# Patient Record
Sex: MALE
[De-identification: names, ages, dates, MRNs, and addresses within clinical notes are randomized per-mention and may not be internally consistent; named-entity substitution may affect disease eponyms.]

---

## 2017-12-25 ENCOUNTER — HOSPITAL ENCOUNTER (INPATIENT)
Dept: HOSPITAL 42 - ED | Age: 57
LOS: 8 days | Discharge: HOME | DRG: 871 | End: 2018-01-02
Attending: INTERNAL MEDICINE | Admitting: INTERNAL MEDICINE
Payer: COMMERCIAL

## 2017-12-25 VITALS — BODY MASS INDEX: 46.6 KG/M2

## 2017-12-25 DIAGNOSIS — J44.1: ICD-10-CM

## 2017-12-25 DIAGNOSIS — J44.0: ICD-10-CM

## 2017-12-25 DIAGNOSIS — R40.2412: ICD-10-CM

## 2017-12-25 DIAGNOSIS — R26.81: ICD-10-CM

## 2017-12-25 DIAGNOSIS — F39: ICD-10-CM

## 2017-12-25 DIAGNOSIS — G47.33: ICD-10-CM

## 2017-12-25 DIAGNOSIS — F41.9: ICD-10-CM

## 2017-12-25 DIAGNOSIS — K65.2: ICD-10-CM

## 2017-12-25 DIAGNOSIS — F10.10: ICD-10-CM

## 2017-12-25 DIAGNOSIS — K70.31: ICD-10-CM

## 2017-12-25 DIAGNOSIS — I87.2: ICD-10-CM

## 2017-12-25 DIAGNOSIS — D64.9: ICD-10-CM

## 2017-12-25 DIAGNOSIS — I50.33: ICD-10-CM

## 2017-12-25 DIAGNOSIS — M48.00: ICD-10-CM

## 2017-12-25 DIAGNOSIS — Z79.899: ICD-10-CM

## 2017-12-25 DIAGNOSIS — J98.11: ICD-10-CM

## 2017-12-25 DIAGNOSIS — M54.30: ICD-10-CM

## 2017-12-25 DIAGNOSIS — A41.9: Primary | ICD-10-CM

## 2017-12-25 DIAGNOSIS — I27.20: ICD-10-CM

## 2017-12-25 DIAGNOSIS — E66.01: ICD-10-CM

## 2017-12-25 DIAGNOSIS — G62.9: ICD-10-CM

## 2017-12-25 DIAGNOSIS — G57.11: ICD-10-CM

## 2017-12-25 DIAGNOSIS — F17.200: ICD-10-CM

## 2017-12-25 DIAGNOSIS — L85.3: ICD-10-CM

## 2017-12-25 DIAGNOSIS — L03.119: ICD-10-CM

## 2017-12-25 DIAGNOSIS — D72.820: ICD-10-CM

## 2017-12-25 DIAGNOSIS — L30.9: ICD-10-CM

## 2017-12-25 DIAGNOSIS — G47.00: ICD-10-CM

## 2017-12-25 DIAGNOSIS — B35.1: ICD-10-CM

## 2017-12-25 DIAGNOSIS — M19.90: ICD-10-CM

## 2017-12-25 DIAGNOSIS — J18.9: ICD-10-CM

## 2017-12-25 LAB
% IRON SATURATION: 16 % (ref 20–55)
ALBUMIN SERPL-MCNC: 4.5 G/DL (ref 3–4.8)
ALBUMIN/GLOB SERPL: 1.1 {RATIO} (ref 1.1–1.8)
ALT SERPL-CCNC: 32 U/L (ref 7–56)
APPEARANCE UR: CLEAR
APTT BLD: 33.6 SECONDS (ref 25.1–36.5)
AST SERPL-CCNC: 38 U/L (ref 17–59)
BASE EXCESS BLDV CALC-SCNC: 4.2 MMOL/L (ref 0–2)
BASOPHILS # BLD AUTO: 0.04 K/MM3 (ref 0–2)
BASOPHILS NFR BLD: 0.7 % (ref 0–3)
BILIRUB UR-MCNC: NEGATIVE MG/DL
BNP SERPL-MCNC: 1020 PG/ML (ref 0–450)
BUN SERPL-MCNC: 9 MG/DL (ref 7–21)
CALCIUM SERPL-MCNC: 9.7 MG/DL (ref 8.4–10.5)
COLOR UR: YELLOW
EOSINOPHIL # BLD: 0.2 10*3/UL (ref 0–0.7)
EOSINOPHIL NFR BLD: 2.5 % (ref 1.5–5)
ERYTHROCYTE [DISTWIDTH] IN BLOOD BY AUTOMATED COUNT: 14.5 % (ref 11.5–14.5)
GFR NON-AFRICAN AMERICAN: > 60
GLUCOSE UR STRIP-MCNC: NEGATIVE MG/DL
GRANULOCYTES # BLD: 4.12 10*3/UL (ref 1.4–6.5)
GRANULOCYTES NFR BLD: 67.3 % (ref 50–68)
HGB BLD-MCNC: 14.9 G/DL (ref 14–18)
INR PPP: 1.49 (ref 0.93–1.08)
IRON SERPL-MCNC: 62 UG/DL (ref 45–180)
LEUKOCYTE ESTERASE UR-ACNC: NEGATIVE LEU/UL
LYMPHOCYTES # BLD: 1 10*3/UL (ref 1.2–3.4)
LYMPHOCYTES NFR BLD AUTO: 16.3 % (ref 22–35)
MAGNESIUM SERPL-MCNC: 2.1 MG/DL (ref 1.7–2.2)
MCH RBC QN AUTO: 28.1 PG (ref 25–35)
MCHC RBC AUTO-ENTMCNC: 31.6 G/DL (ref 31–37)
MCV RBC AUTO: 88.9 FL (ref 80–105)
MONOCYTES # BLD AUTO: 0.8 10*3/UL (ref 0.1–0.6)
MONOCYTES NFR BLD: 13.2 % (ref 1–6)
PH BLDV: 7.36 [PH] (ref 7.32–7.43)
PH UR STRIP: 7 [PH] (ref 4.7–8)
PLATELET # BLD: 151 10^3/UL (ref 120–450)
PMV BLD AUTO: 10.8 FL (ref 7–11)
PROT UR STRIP-MCNC: NEGATIVE MG/DL
PROTHROMBIN TIME: 16.5 SECONDS (ref 9.4–12.5)
RBC # BLD AUTO: 5.3 10^6/UL (ref 3.5–6.1)
RBC # UR STRIP: NEGATIVE /UL
SP GR UR STRIP: 1.01 (ref 1–1.03)
TIBC SERPL-MCNC: 375 UG/DL (ref 261–462)
TROPONIN I SERPL-MCNC: < 0.01 NG/ML
URINE NITRATE: NEGATIVE
UROBILINOGEN UR STRIP-ACNC: 0.2 E.U./DL
VENOUS BLOOD FIO2: 21 %
VENOUS BLOOD GAS PCO2: 55 (ref 40–60)
VENOUS BLOOD GAS PO2: 23 MM/HG (ref 30–55)
WBC # BLD AUTO: 6.1 10^3/UL (ref 4.5–11)

## 2017-12-25 RX ADMIN — METHYLPREDNISOLONE SODIUM SUCCINATE SCH MG: 40 INJECTION, POWDER, FOR SOLUTION INTRAMUSCULAR; INTRAVENOUS at 21:51

## 2017-12-25 RX ADMIN — IPRATROPIUM BROMIDE AND ALBUTEROL SULFATE SCH ML: .5; 3 SOLUTION RESPIRATORY (INHALATION) at 11:00

## 2017-12-25 RX ADMIN — IPRATROPIUM BROMIDE AND ALBUTEROL SULFATE SCH ML: .5; 3 SOLUTION RESPIRATORY (INHALATION) at 10:38

## 2017-12-25 RX ADMIN — IPRATROPIUM BROMIDE AND ALBUTEROL SULFATE SCH ML: .5; 3 SOLUTION RESPIRATORY (INHALATION) at 10:22

## 2017-12-25 RX ADMIN — HYDROMORPHONE HYDROCHLORIDE PRN MG: 1 INJECTION, SOLUTION INTRAMUSCULAR; INTRAVENOUS; SUBCUTANEOUS at 16:41

## 2017-12-25 NOTE — US
HISTORY:

severe abd swelling, likely ascites 



COMPARISON:

05/10/2015



TECHNIQUE:

Sonographic evaluation of the abdomen.



FINDINGS:



LIVER:

Measures 22.8 cm.  Hepatopedal blood flow. Fatty infiltration 

manifest ultrasonographically as increased echogenicity of the liver 

parenchyma. No mass. No intrahepatic bile duct dilatation.



GALLBLADDER:

Unremarkable. No gallstones.



COMMON BILE DUCT:

Measures 6.5 mm. No stones. No dilatation.



PANCREAS:

Obscured by overlying bowel gas. Non diagnostic assessment of the 

pancreas



RIGHT KIDNEY:

Measures 6.4 x 14cm. Normal echogenicity. No calculus, mass, or 

hydronephrosis.



LEFT KIDNEY:

Measures 6.4 x 13.7cm. Normal echogenicity. No calculus, mass, or 

hydronephrosis.



SPLEEN:

Splenomegaly.  Orthogonal measurements 9.4 x 13.8 cm.



AORTA:

Obscured by overlying bowel gas. Non diagnostic assessment of 

abdominal aorta 



IVC:

Obscured by overlying bowel gas. Non diagnostic assessment of 

inferior vena cava 



OTHER FINDINGS:

Intra-abdominal ascites difficult to quantify. 



IMPRESSION:

Hepatosplenomegaly without focal abnormality.



Intra-abdominal ascites.

## 2017-12-25 NOTE — RAD
HISTORY:

Shortness of breath.



COMPARISON:

05/08/2015.



TECHNIQUE:

Chest PA and lateral



FINDINGS:



LUNGS:

Camilo lower lobe infiltrates/atelectasis.



PLEURA:

No significant pleural effusion identified. No pneumothorax apparent.



CARDIOVASCULAR:

Cardiomegaly.  No evidence of acute, significant cardiovascular 

disease. 



OSSEOUS STRUCTURES:

No significant abnormalities.



VISUALIZED UPPER ABDOMEN:

Normal.



OTHER FINDINGS:

None.



IMPRESSION:

Right lower lobe infiltrate, atelectasis. These represent new 

findings compared to the prior study.



___________________________________________________________



Concordant results with the preliminary interpretation rendered by 

the emergency department physician

procedure.

## 2017-12-25 NOTE — ED PDOC
Arrival/HPI





- General


Chief Complaint: Shortness Of Breath


Time Seen by Provider: 12/25/17 10:11


Historian: Patient, Family (brother)





- History of Present Illness


Narrative History of Present Illness (Text): 





12/25/17 10:18


pt p/w + sob x 4-5 days, worse with exertion, currently sob at rest; + coughing 

without sputum production over the last few days; pt also noted worsening 

weight gain and increasing abd girth as well as lower ext swelling; pt states 

swelling to lower legs has been ongoing since 2 weeks ago; pt states his last 

alcohol drink was last week; pt states he is not making any noises with 

breathing, no wheezing; pt states no fever/chills/sweats, no cp/palpitations, 

mild diffuse abd pain, no n/v, no numbness/tingling, + b/l upper thigh burning/

pain; pt states no urinary/bowel changes, no rashes; no altered behavior is 

noted by brother; no fall/trauma/sick contact, no travel; pt is here for 

further eval; pt's without other complaints.


12/25/17 10:26





Time/Duration: < week


Symptom Onset: Gradual


Symptom Course: Worsening


Quality: Tightness


Severity Level: 8


Activities at Onset: Rest


Context: Sitting, Walking





Past Medical History





- Provider Review


Nursing Documentation Reviewed: Yes





- Travel History


Have you recently traveled outside [place] w/in the past 3 mons?: No





- Infectious Disease


Hx of Infectious Diseases: None





- Tetanus Immunization


Tetanus Immunization: Unknown





- Cardiac


Hx Cardiac Disorders: Yes


Hx Peripheral Edema: Yes (ble +2)





- Pulmonary


Hx Respiratory Disorders: Yes


Hx Asthma: Yes


Hx Chronic Obstructive Pulmonary Disease (COPD): Yes


Other/Comment: current smoker





- Neurological


Hx Neurological Disorder: Yes


Other/Comment: nueropathy





- HEENT


Hx HEENT Disorder: Yes (wears eyeglassees)





- Hematological/Oncological


Hx Anemia: Yes


Hx Cirrhosis: Yes





- Integumentary


Other/Comment: muiltiple healed leg wounds ble





- Musculoskeletal/Rheumatological


Hx Arthritis: Yes


Hx Back Pain: Yes


Hx Falls: No


Hx Herniated Disk: Yes


Hx Spinal Stenosis: Yes


Hx Unsteady Gait: Yes (cane)





- Gastrointestinal


Hx Gastrointestinal Disorders: Yes (ascites; no paracentesis in the past)


Hx Liver Failure: Yes





- Genitourinary/Gynecological


Hx Genitourinary Disorders:  (swollen testicles)





- Psychiatric


Hx Anxiety: Yes


Hx Depression: Yes


Hx Physical Abuse: No


Hx Substance Use: Yes (alcohol)


Other/Comment: ETOH





- Past Surgical History


Past Surgical History: Non-Contributing





- Anesthesia


Hx Anesthesia: No





- Suicidal Assessment


Suicidal Thoughts: No


Feels Threatened In Home Enviroment: No





Family/Social History





- Physician Review


Nursing Documentation Reviewed: Yes


Family/Social History: Unknown Family HX


Smoking Status: Current Some Days Smoker


Hx Alcohol Use: Yes


Amount per day: 6


Hx Substance Use: No





Allergies/Home Meds


Allergies/Adverse Reactions: 


Allergies





No Known Allergies Allergy (Verified 12/25/17 10:02)


 








Home Medications: 


 Home Meds











 Medication  Instructions  Recorded  Confirmed


 


Albuterol Sulfate [Proair 1 puff IH DAILY 12/25/17 12/25/17





Respiclick]   


 


Clonazepam [Klonopin] 0.5 mg PO BID 12/25/17 12/25/17


 


Fluticasone/Vilanterol [Breo 1 puff IH DAILY 12/25/17 12/25/17





Ellipta 100-25 Mcg INH]   


 


Folic Acid [Folic Acid] 1 mg PO DAILY 12/25/17 12/25/17


 


Furosemide [Lasix] 20 mg PO DAILY 12/25/17 12/25/17


 


Gabapentin [Neurontin] 600 mg PO BID 12/25/17 12/25/17


 


QUEtiapine [SEROquel] 200 mg PO DAILY 12/25/17 12/25/17


 


Tiotropium Bromide [Spiriva 1 puff IH DAILY 12/25/17 12/25/17





Respimat]   














Review of Systems





- Review of Systems


Constitutional: Weight Change


Eyes: Normal


ENT: Normal


Respiratory: SOB, Cough.  absent: Sputum, Wheezing


Cardiovascular: Normal


Gastrointestinal: Abdominal Pain


Genitourinary Male: Normal


Musculoskeletal: Joint Swelling


Skin: Normal


Neurological: Dizziness


Endocrine: Normal


Hemo/Lymphatic: Normal


Psychiatric: Normal





Physical Exam


Vital Signs Reviewed: Yes


Vital Signs











  Temp Pulse Resp BP Pulse Ox


 


 12/25/17 11:51   95 H  17  136/86  97


 


 12/25/17 10:31    16   97


 


 12/25/17 10:22     161/94 H 


 


 12/25/17 10:05  98.6 F  95 H  21  161/94 H  97











Temperature: Afebrile


Blood Pressure: Hypertensive


Pulse: Regular


Respiratory Rate: Tachypneic


Appearance: Positive for: Well-Appearing, Non-Toxic, Other (uncomfortable, 

moderate distress due to sob, alert/awake, resting in bed, cooperative)


Pain Distress: None


Mental Status: Positive for: Alert and Oriented X 3





- Systems Exam


Head: Present: Atraumatic, Normocephalic


Pupils: Present: PERRL, Other (no nystagmus, no photophobia, sclera slightly 

icteric, visual field intact b/l)


Extroacular Muscles: Present: EOMI


Ears: Present: Normal


Mouth: Present: Dry, Other (fair dentitions, no drooling/stridor, no dysphonia, 

no exudate/lesions)


Nose (External): Present: Atraumatic


Neck: Present: Normal Range of Motion, Trachea Midline, Other (no meningeal 

signs noted).  No: MIDLINE TENDERNESS


Respiratory/Chest: Present: Other (no asymmetric breath sounds, decr breath 

sounds noted, mild tachypenia, no accessory muscle use noted, basiliar crackles 

noted, no gross wheezing/rales/rhonchi b/l).  No: Respiratory Distress, 

Accessory Muscle Use


Cardiovascular: Present: Regular Rate and Rhythm, Normal S1, S2, Tachycardic, 

Other (distant heart sound, +S1, +S2, + tachy, no murmur, no regurg).  No: 

Murmurs


Abdomen: Present: Normal Bowel Sounds, Other (well nourished/obese male, + 

distended abd, no focal tenderness, no fluid wave noted, no ritter's sign, no 

mcburney's point tenderness, no masses/rebound/guarding/rigidity).  No: 

Tenderness, Distention, Peritoneal Signs


Back: Present: Normal Inspection


Upper Extremity: Present: Normal Inspection, Normal ROM, NORMAL PULSES, 

Neurovascularly Intact, Capillary Refill < 2s.  No: Cyanosis, Edema


Lower Extremity: Present: NORMAL PULSES, Swelling, Other (b/l lower ext swelling

/pitting edema ~ +3-4/5 up to proximal knee region; dry skin, no ulcerations 

noted, no facundo's sign noted b/l, strength 5/5 grossly intact in all limbs, +2/

2 distal pulses noted; slight dusky appearance of b/l foot/toes, warm to touch 

however).  No: Edema, Facundo's Sign


Neurological: Present: GCS=15, CN II-XII Intact, Speech Normal, Other (no 

facial asymmetries)


Skin: Present: Warm, Dry.  No: Rashes, Erythematous, Pale


Psychiatric: Present: Alert, Oriented x 3, Normal Insight, Normal Concentration





Medical Decision Making


ED Course and Treatment: 





12/25/17 10:27


Impression: sob/weight gain/leg swelling, abd distention





i have consider all the differential diagnosis regarding pt's chief medical 

complaints/clinical findings, including but are not limited to: 


Differential Diagnosis included but are not limited to: likely worsening ascites

, r/o cardiogenic cause, r/o pulm cause, r/o infection; unlikely encephalopathy





A/P: weakness/weight changes/leg swelling/abd distention/sob


- labs


- iv


- xray


- u/s


- ekg


- observe


- supportive care


12/25/17 13:15





12:30pm - I spoke with Dr Stein, PCP on call, made aware, agrees with ED mgt/

txt/dx and agrees with admission, would like Dr TIMO Gallegos consulted for possible 

IR procedure tomorrow and agrees with consult with PSYCH for alcohol 

detoxification





pt is feeling improved, less sob


pt is able to speak in full sentences





i have addressed pt's concern/questions regarding his diagnosis/medical 

findings and possibility of paracentesis


pt is made aware


pt agrees with admission


Re-evaluation Time: 10:29


Reassessment Condition: Improving,but remains with symptoms





- Critical Care


Critical Care Minutes: 45 minutes


Critical Care Time: Excluding Proc Time


Narrative Critical Care (Text): 





12/25/17 10:36


critical care time: 45min, excluding procedure time, excluding time teaching 

residents/students/mid-level providers; including initial eval/diagnosis, 

diagnostic interpretation, re-eval, consultations, final disposition





- Lab Interpretations


Lab Results: 








 12/25/17 10:10 





 12/25/17 10:10 





 Lab Results





12/25/17 11:20: Urine Color Yellow, Urine Appearance Clear, Urine pH 7.0, Ur 

Specific Gravity 1.010, Urine Protein Negative, Urine Glucose (UA) Negative, 

Urine Ketones Negative, Urine Blood Negative, Urine Nitrate Negative, Urine 

Bilirubin Negative, Urine Urobilinogen 0.2, Ur Leukocyte Esterase Negative


12/25/17 10:35: pO2 23 L, VBG pH 7.36, VBG pCO2 55.0, VBG HCO3 31.1 H, VBG 

Total CO2 32.8 H, VBG O2 Sat (Calc) 46.7, VBG Base Excess 4.2 H, VBG Potassium 

4.0, Glucose 120 H, Lactate 1.8, FiO2 21.0, Sodium 140.0, Chloride 99.0, Venous 

Blood Potassium 4.0


12/25/17 10:10: Alcohol, Quantitative < 10


12/25/17 10:10: Ammonia < 9 L


12/25/17 10:10: Sodium 142, Potassium 3.8, Chloride 100, Carbon Dioxide 29, 

Anion Gap 17, BUN 9, Creatinine 0.9, Est GFR (African Amer) > 60, Est GFR (Non-

Af Amer) > 60, Random Glucose 116 H, Calcium 9.7, Phosphorus 4.1, Magnesium 2.1

, Total Bilirubin 2.0 H, AST 38, ALT 32, Alkaline Phosphatase 115, Lactate 

Dehydrogenase 477, Total Creatine Kinase 95, Troponin I < 0.01, NT-Pro-B 

Natriuret Pep 1020 H, Total Protein 8.5 H, Albumin 4.5, Globulin 4.0, Albumin/

Globulin Ratio 1.1


12/25/17 10:10: PT 16.5 H, INR 1.49 H, APTT 33.6


12/25/17 10:10: WBC 6.1, RBC 5.30, Hgb 14.9, Hct 47.1, MCV 88.9, MCH 28.1, MCHC 

31.6, RDW 14.5, Plt Count 151, MPV 10.8, Gran % 67.3, Lymph % (Auto) 16.3 L, 

Mono % (Auto) 13.2 H, Eos % (Auto) 2.5, Baso % (Auto) 0.7, Gran # 4.12, Lymph # 

1.0 L, Mono # 0.8 H, Eos # 0.2, Baso # 0.04





elevated BNP, mildly elevated bili


I have reviewed the lab results: Yes (elevated BNP)


Interpretation: Abnormal lab values





- RAD Interpretation


Radiology Orders: 








12/25/17 10:13


CHEST TWO VIEWS (PA/LAT) [RAD] Stat 





12/25/17 10:14


DUPLEX LOWER EXTRM VEIN BILAT [US] Stat 





12/25/17 10:16


ABDOMEN COMPLETE [US] Stat 








Cxr - poor insp effort


right pleural atelectasis


linear opacity noted to right lower lung fields


vascular congestion noted





12/25/17 11:50


chest xray


Creator : Ajith Linn MD


IMPRESSION:


Right lower lobe infiltrate, atelectasis. These represent new findings compared 

to the prior study.


Concordant results with the preliminary interpretation rendered by the 

emergency department physician\PA at the conclusion of the procedure.





12/25/17 11:57


US abdomen


Creator : Ajith Linn MD


IMPRESSION:


Hepatosplenomegaly without focal abnormality.


Intra-abdominal ascites.





Duplex u/s: prelim reading, NO DVT b/l


: ED Physician, Radiologist





- EKG Interpretation


EKG Interpretation (Text): 





12/25/17 11:09


Sinus tach at 100 bpm, with PACs, normal axis, diffuse low voltage, non-

specific st-t changes, ABNL EKG; unchanged compare with old ekg 5/2015 12/25/17 11:11





Interpreted by ED Physician: Yes


Type: 12 lead EKG





- Medication Orders


Current Medication Orders: 











Discontinued Medications





Albuterol/Ipratropium (Duoneb 3 Mg/0.5 Mg (3 Ml) Ud)  3 ml IH Q15M ALVARO


   Stop: 12/25/17 10:46


   Last Admin: 12/25/17 11:00  Dose: 3 ml





Furosemide (Lasix)  100 mg IVP STAT STA


   Stop: 12/25/17 10:15


   Last Admin: 12/25/17 10:22  Dose: 100 mg





MAR Blood Pressure


 Document     12/25/17 10:22  L[facility]  (Rec: 12/25/17 10:23  LMC  [id number])


     Blood Pressure


      Blood Pressure (100//90)             161/94


IVP Administration


 Document     12/25/17 10:22  LMC  (Rec: 12/25/17 10:23  LMC  [id number])


     Charges for Administration


      # of IVP Administrations                   1














Disposition/Present on Arrival





- Present on Arrival


Any Indicators Present on Arrival: No


History of DVT/PE: No


History of Uncontrolled Diabetes: No


Urinary Catheter: No


History of Decub. Ulcer: No


History Surgical Site Infection Following: None





- Disposition


Have Diagnosis and Disposition been Completed?: Yes


Diagnosis: 


 Ascites due to alcoholic cirrhosis, Shortness of breath, Leg edema





Disposition: HOSPITALIZED


Disposition Time: 11:18


Patient Plan: Admission


Condition: STABLE

## 2017-12-26 LAB
ALBUMIN SERPL-MCNC: 4.1 G/DL (ref 3–4.8)
ALBUMIN/GLOB SERPL: 1.2 {RATIO} (ref 1.1–1.8)
ALT SERPL-CCNC: 32 U/L (ref 7–56)
APPEARANCE FLD: (no result)
AST SERPL-CCNC: 39 U/L (ref 17–59)
BILIRUB DIRECT SERPL-MCNC: 0.8 MG/DL (ref 0–0.4)
BNP SERPL-MCNC: 782 PG/ML (ref 0–450)
BODY FLD TYPE: (no result)
BUN SERPL-MCNC: 11 MG/DL (ref 7–21)
CALCIUM SERPL-MCNC: 9.1 MG/DL (ref 8.4–10.5)
CELL CNT PNL FLD: 100 (ref 0–0)
ERYTHROCYTE [DISTWIDTH] IN BLOOD BY AUTOMATED COUNT: 14.4 % (ref 11.5–14.5)
FOLATE SERPL-MCNC: > 20 NG/ML
GFR NON-AFRICAN AMERICAN: > 60
HDLC SERPL-MCNC: 41 MG/DL (ref 29–60)
HGB BLD-MCNC: 14.2 G/DL (ref 14–18)
INR PPP: 1.52 (ref 0.93–1.08)
LDLC SERPL-MCNC: 77 MG/DL (ref 0–129)
MCH RBC QN AUTO: 27.6 PG (ref 25–35)
MCHC RBC AUTO-ENTMCNC: 31.1 G/DL (ref 31–37)
MCV RBC AUTO: 88.9 FL (ref 80–105)
PLATELET # BLD: 162 10^3/UL (ref 120–450)
PMV BLD AUTO: 10.7 FL (ref 7–11)
PROTHROMBIN TIME: 16.9 SECONDS (ref 9.4–12.5)
RBC # BLD AUTO: 5.14 10^6/UL (ref 3.5–6.1)
VIT B12 SERPL-MCNC: 673 PG/ML (ref 239–931)
WBC # BLD AUTO: 5.6 10^3/UL (ref 4.5–11)
WBC # FLD: 509 /UL (ref 0–300)

## 2017-12-26 PROCEDURE — BW40ZZZ ULTRASONOGRAPHY OF ABDOMEN: ICD-10-PCS

## 2017-12-26 PROCEDURE — 0W9G3ZZ DRAINAGE OF PERITONEAL CAVITY, PERCUTANEOUS APPROACH: ICD-10-PCS

## 2017-12-26 RX ADMIN — HYDROMORPHONE HYDROCHLORIDE PRN MG: 1 INJECTION, SOLUTION INTRAMUSCULAR; INTRAVENOUS; SUBCUTANEOUS at 20:30

## 2017-12-26 RX ADMIN — METHYLPREDNISOLONE SODIUM SUCCINATE SCH MG: 40 INJECTION, POWDER, FOR SOLUTION INTRAMUSCULAR; INTRAVENOUS at 09:39

## 2017-12-26 RX ADMIN — MEROPENEM SCH MLS/HR: 1 INJECTION INTRAVENOUS at 22:22

## 2017-12-26 RX ADMIN — HYDROMORPHONE HYDROCHLORIDE PRN MG: 1 INJECTION, SOLUTION INTRAMUSCULAR; INTRAVENOUS; SUBCUTANEOUS at 09:41

## 2017-12-26 RX ADMIN — METHYLPREDNISOLONE SODIUM SUCCINATE SCH MG: 40 INJECTION, POWDER, FOR SOLUTION INTRAMUSCULAR; INTRAVENOUS at 22:21

## 2017-12-26 NOTE — CP.PCM.PN
<Adore Sawant - Last Filed: 12/27/17 01:09>





Subjective





- Date & Time of Evaluation


Date of Evaluation: 12/26/17


Time of Evaluation: 09:30





- Subjective


Subjective: 


68 yr  male w/ history of periperhal edema, asthma, COPD, peripheral 

neuropathy, anemia, cirrhosis of liver, multiple leg wound, DJD, arthritis, 

back pain, herniated disc, morbid obesity, spinal stenosis, unsteady gait, 

swollen testicles, anxiety, depression, & ETOH abuse. Pt resting in bed 

preparing for abdominal paracentesis for his ascites. He denies any stomach 

discomfort at this time. He states that his legs are painful and are always 

swollen. Denies any headaches, SOB, N/V, fevers, constipation, diarrhea, 

urinary changes or distress.








Objective





- Vital Signs/Intake and Output


Vital Signs (last 24 hours): 


 











Temp Pulse Resp BP Pulse Ox


 


 98.3 F   90   20   113/54 L  98 


 


 12/26/17 16:00  12/26/17 16:00  12/26/17 16:00  12/26/17 16:00  12/26/17 16:00








Intake and Output: 


 











 12/26/17 12/27/17





 18:59 06:59


 


Intake Total 720 780


 


Balance 720 780














- Medications


Medications: 


 Current Medications





Azithromycin (Zithromax)  500 mg PO DAILY ALVARO


   PRN Reason: Protocol


   Last Admin: 12/26/17 09:40 Dose:  500 mg


Clonazepam (Klonopin)  0.25 mg PO BID ALVARO


   PRN Reason: Protocol


   Last Admin: 12/26/17 17:03 Dose:  0.25 mg


Folic Acid (Folic Acid)  1 mg PO DAILY ALVARO


   Last Admin: 12/26/17 09:40 Dose:  1 mg


Furosemide (Lasix)  40 mg IVP Q12 ALVARO


   Last Admin: 12/26/17 09:42 Dose:  40 mg


Gabapentin (Neurontin)  600 mg PO BID ALVARO


   PRN Reason: Protocol


   Last Admin: 12/26/17 17:03 Dose:  600 mg


Home Med (Home Med)  1 unit IH DAILY ALVARO


   Last Admin: 12/26/17 09:41 Dose:  1 unit


Home Med (Home Med)  1 unit IH DAILY ALVARO


   Last Admin: 12/26/17 09:41 Dose:  1 unit


Home Med (Home Med)  1 unit IH DAILY ALVARO


   Last Admin: 12/26/17 09:41 Dose:  1 unit


Hydromorphone HCl (Dilaudid)  0.25 mg IVP Q6H PRN


   PRN Reason: Pain, severe (8-10)


   Last Admin: 12/26/17 20:30 Dose:  0.25 mg


Meropenem (Merrem Iv 1 Gm Premix)  50 mls @ 100 mls/hr IVPB Q8 ALVARO


   PRN Reason: Protocol


   Stop: 01/04/18 22:01


Methylprednisolone (Solu-Medrol)  30 mg IVP Q12 [facility]


   Last Admin: 12/26/17 09:39 Dose:  30 mg


Quetiapine Fumarate (Seroquel)  200 mg PO DAILY [facility]


   Last Admin: 12/26/17 09:39 Dose:  200 mg


Thiamine HCl (Vitamin B1 Tab)  100 mg PO DAILY [facility]


   Last Admin: 12/26/17 09:40 Dose:  100 mg











- Labs


Labs: 


 





 12/26/17 06:40 





 12/26/17 06:40 





 











PT  16.9 SECONDS (9.4-12.5)  H  12/26/17  06:40    


 


INR  1.52  (0.93-1.08)  H  12/26/17  06:40    


 


APTT  33.6 Seconds (25.1-36.5)   12/25/17  10:10    














- Constitutional


Appears: Chronically Ill





- Head Exam


Head Exam: ATRAUMATIC, NORMAL INSPECTION, NORMOCEPHALIC





- Eye Exam


Eye Exam: EOMI, Normal appearance, PERRL





- ENT Exam


ENT Exam: Mucous Membranes Moist, Normal Exam





- Neck Exam


Neck Exam: Full ROM, Normal Inspection.  absent: Lymphadenopathy





- Respiratory Exam


Respiratory Exam: Decreased Breath Sounds, Clear to Ausculation Bilateral, 

NORMAL BREATHING PATTERN





- Cardiovascular Exam


Cardiovascular Exam: REGULAR RHYTHM, +S1, +S2.  absent: Murmur





- GI/Abdominal Exam


GI & Abdominal Exam: Distended, Firm


Additional comments: 





ascites. morbidly obese.





- Extremities Exam


Extremities Exam: Joint Swelling, Tenderness


Additional comments: 





BLE discoloration. warm. edematous +2





- Neurological Exam


Neurological Exam: Alert, Awake, Oriented x3





- Psychiatric Exam


Psychiatric exam: Depressed, Normal Affect





- Skin


Skin Exam: Dry, Intact, Normal Color, Warm





Assessment and Plan


(1) Hyperglycemia


Status: Acute   





(2) Elevated LFTs


Status: Acute   





(3) Pneumonia


Status: Acute   





(4) Ascites due to alcoholic cirrhosis


Status: Acute   





(5) Shortness of breath


Status: Acute   





- Assessment and Plan (Free Text)


Plan: 





IV meropenem/azithromycin. IV steroids. Bipap. Paracentesis done (650ml removed)

. BLE discoloration, possible cellulitis podiatry evaluation pending. ETOH/

Depression psych evaluation pending. 





Consults:


Surgery - Dr. CATHERINE Gallegos = ?  


GI - Dr. Bill = ?


Pulmonary -  = IV abx azithromycin. IV and inhaled bronchodilators. 

therapeutic paracentesis recommended. smoking cessation, fall precautions, 

added thiamine. 


I.D. - Dr. Vergara = EF 56%, IV abx meropenem, cultures (sputum, urine, 

peritoneal fluid), r/o legionella antigen in urine


Podiatry - Dr. Buckley = ?


Psych - Dr. Marcelino = ?





Reviewed:


CXR = RLL infiltrate, atelectasis


Bilateral leg doppler = (-) DVT 


US abd = hepatosplenomegaly w/o focal abnormality, intra-abdominal ascites


ECG = ABNORMAL SR w. PAC, R superior axis deviation, pulmonary disease pattern, 

nonspecific T wave abn








<Estela Stein - Last Filed: 12/27/17 17:43>





Objective





- Vital Signs/Intake and Output


Vital Signs (last 24 hours): 


 











Temp Pulse Resp BP Pulse Ox


 


 97.6 F   83   20   101/38 L  96 


 


 12/27/17 16:00  12/27/17 16:00  12/27/17 16:00  12/27/17 16:00  12/27/17 16:00








Intake and Output: 


 











 12/27/17 12/27/17





 06:59 18:59


 


Intake Total 780 


 


Balance 780 














- Medications


Medications: 


 Current Medications





Azithromycin (Zithromax)  500 mg PO DAILY ALVARO


   PRN Reason: Protocol


   Last Admin: 12/27/17 09:22 Dose:  500 mg


Betamethasone/Clotrimazole (Lotrisone)  0 gm TOP BID ALVARO


   Last Admin: 12/27/17 17:24 Dose:  1 appful


Clonazepam (Klonopin)  0.25 mg PO BID ALVARO


   PRN Reason: Protocol


   Last Admin: 12/27/17 17:23 Dose:  0.25 mg


Folic Acid (Folic Acid)  1 mg PO DAILY [facility]


   Last Admin: 12/27/17 09:21 Dose:  1 mg


Furosemide (Lasix)  40 mg IVP Q12 [facility]


   Last Admin: 12/27/17 09:24 Dose:  40 mg


Gabapentin (Neurontin)  600 mg PO BID ALVARO


   PRN Reason: Protocol


   Last Admin: 12/27/17 17:23 Dose:  600 mg


Home Med (Home Med)  1 unit IH DAILY [facility]


   Last Admin: 12/27/17 09:23 Dose:  1 unit


Home Med (Home Med)  1 unit IH DAILY ALVARO


   Last Admin: 12/27/17 09:23 Dose:  1 unit


Home Med (Home Med)  1 unit IH DAILY [facility]


   Last Admin: 12/27/17 09:23 Dose:  1 unit


Hydromorphone HCl (Dilaudid)  0.25 mg IVP Q6H PRN


   PRN Reason: Pain, severe (8-10)


   Last Admin: 12/27/17 12:31 Dose:  0.25 mg


Meropenem (Merrem Iv 1 Gm Premix)  50 mls @ 100 mls/hr IVPB Q8 ALVARO


   PRN Reason: Protocol


   Stop: 01/04/18 22:01


   Last Admin: 12/27/17 13:59 Dose:  100 mls/hr


Methylprednisolone (Solu-Medrol)  30 mg IVP Q12 [facility]


   Last Admin: 12/27/17 09:22 Dose:  30 mg


Quetiapine Fumarate (Seroquel)  100 mg PO HS ALVARO


   PRN Reason: Protocol


Thiamine HCl (Vitamin B1 Tab)  100 mg PO DAILY [facility]


   Last Admin: 12/27/17 09:22 Dose:  100 mg











- Labs


Labs: 


 





 12/26/17 06:40 





 12/27/17 06:30 





 











PT  16.9 SECONDS (9.4-12.5)  H  12/26/17  06:40    


 


INR  1.52  (0.93-1.08)  H  12/26/17  06:40    


 


APTT  33.6 Seconds (25.1-36.5)   12/25/17  10:10    














Assessment and Plan





- Assessment and Plan (Free Text)


Plan: 





pt is seen ang examined at bed side , looking comfortable . agreed all above . 

no change of status . cont. present treatment , feeling better after 

paracentesis . sob decrease . will f/u

## 2017-12-26 NOTE — CARD
--------------- APPROVED REPORT --------------





EKG Measurement

Heart [id number]

MN 170P-2

[id number]

[id number]

[id number]



<Conclusion>

Sinus rhythm with premature atrial complexes

Right superior axis deviation

Pulmonary disease pattern

Nonspecific T wave abnormality

Abnormal ECG

## 2017-12-26 NOTE — PN
PULMONARY PROGRESS NOTE



DATE:  12/26/2017



REFERRING PHYSICIAN:  Estela Stein MD



SUBJECTIVE:  He is lying in the bed, feels better, tolerated BiPAP well,

and generalized pain is better.  Cough and shortness of breath is better,

status post paracentesis.



OBJECTIVE:

GENERAL:  In no acute distress.

VITAL SIGNS:  Temperature is 98, heart is 90, respiratory rate is 20, blood

pressure is 113/54, and pulse ox is 98% 2 L nasal cannula.

HEENT:  Moist mucous membrane.  Crowded airway.  Mallampati score is IV.

NECK:  Supple.  No JVD.

LUNGS:  Has a few scattered rhonchi.

HEART:  S1 and S2.

ABDOMEN:  Positive bowel sounds, much softer today.

EXTREMITIES:  There is significant edema.

NEUROLOGIC:  Awake and alert and follows simple commands.



MEDICATIONS:  He is on Dilaudid 0.25 mg IV q.6 hours p.r.n., folic acid 1

mg daily, also on Klonopin 0.25 mg twice a day, Lasix 40 mg twice a day,

meropenem 1 g IV q.8 hours, gabapentin 600 mg twice a day, Seroquel 200 mg

daily, Solu-Medrol _____ mg q.12 hours, vitamin B1 100 mg daily, and

Zithromax 500 mg daily.



LABORATORY DATA:  Shows hemoglobin of 14.2, hematocrit 45.7, WBC 5.6, and

platelet is 162.  INR 1.52.  Sodium 142, potassium 4.7, chloride 100,

bicarbonate is 11, creatinine is 0.9, glucose 134, calcium 9.1, AST 39, ALT

32, alk phos is 94, albumin is 4.1, triglyceride is 7.1, and cholesterol is

133.  Folate greater than 20.  B12 is 673.  TSH is 3.20.  Has a

paracentesis done today, which shows a total 6.5 L of fluid was removed.



IMPRESSION AND PLAN:  Pneumonia, morbid obesity, sleep apnea syndrome,

cirrhotic liver, ascites, obstructive pulmonary disease, pulmonary

hypertension, anxiety disorder, depression, history of suicidal ideation in

the past, status post volume paracentesis, and continue IV and inhaled

bronchodilator.  Continue antibiotics.  BiPAP while sleeping.  Follow up

electrolytes closely and watch for prerenal status closely.















Thank you and we will follow with you.





__________________________________________

Tor Jones MD





DD:  12/26/2017 20:25:00

DT:  12/26/2017 20:29:38

Job # [id number]

## 2017-12-26 NOTE — HP
CHIEF COMPLAINT:  Shortness of breath.



HISTORY OF PRESENT ILLNESS:  Mr. Bobby Urena is a 57-year-old male with

history of ascites, feeling with shortness of breath 4 to 5 days, worse

with reversion, currently shortness of breath at rest, coughing without

sputum production over the last few days.  The patient also noted worsening

of weight gain and feeling abdominal tilt as well as lower 

extremities.  The patient states that his last alcohol drink was last week.

The patient states he is not making any noises with breathing, and no

wheezing.  The patient states no fever, chills, or sweats.  No chest pain,

palpitation, mild diffuse abdominal pain.  No nausea or vomiting, diarrhea.

No numbness or tingling.  States no urinary or bowel changes.  No rashes. 

No altered mental status.  No fever.  No chills.



PAST MEDICAL HISTORY:  Peripheral edema +2, asthma, COPD, current smoker,

peripheral neuropathy, anemia, cirrhosis of the liver, multiple healed leg

wound, arthritis, back pain, herniated disc, spinal stenosis, unsteady

gait, history of ascites, no paracentesis in the past, liver failure,

swollen testicles, anxiety, depression, and history of alcohol abuse.



FAMILY HISTORY:  Father and mother, noncontributory.



HABITS:  Smoking, active smoker.  Alcohol, active drinking more per day 6;

substance abuse, no.



ALLERGIES:  THE PATIENT IS ALLERGIC WITH ANY MEDICATION.



HOME MEDICATIONS:  Klonopin, Ventolin, folic acid, Lasix, Neurontin,

Seroquel, and Spiriva.



REVIEW OF SYSTEMS:  The patient seen and EXAMINED on the bedside in the ER.

Wife was sitting in the ER also.  He has weight gain, has shortness of

breath, swelling of the legs, dizziness.  No fever.  No chills.  No

hematuria or hematochezia.



PHYSICAL EXAMINATION:

VITAL SIGNS:  Temperature 98.6, pulse 95, respiratory rate 21, blood

pressure  120/80 , and pulse oximetry 97.

HEENT:  Head is normocephalic and atraumatic.  Eyes; PERRLA.  Extraocular

movements are intact.  Conjunctivae clear.  Nose patent.  Mucous membrane

moist.

NECK:  Supple.  No carotid bruit, JVD or thyromegaly.

CHEST:  Bilaterally symmetrical.

HEART:  S1 and S2 positive.

LUNGS:  Clear to auscultation.

ABDOMEN:  Soft.  Bowel sounds positive.  No organomegaly.

EXTREMITIES:  Positive edema and pigmentation due to stasis dermatitis.

NEUROLOGIC:  Awake and alert.  Moving all 4 extremities.  Cranial nerves

are II to XII are grossly intact.  Obeying  orders.



LABORATORY DATA:  White blood cells is 6.1, hemoglobin is 14.9, hematocrit

is 47.1 and platelets are 151.  Sodium 142, potassium 3.8, BUN 9,

creatinine 0.9, and glucose of 116.



ASSESSMENT AND PLAN:  Mr. Bobby Urena is a 57-years-old male came with

shortness of breath, has pleural atelectasis, linear opacity noted in the

right lower lung field, vascular congestion, right lower lobe infiltrates,

hepatomegaly, obesity, ascites due to alcohol liver cirrhosis, history of

ethanol abuse, history of smoking, peripheral edema, chronic obstructive

pulmonary disease, peripheral neuropathy, multiple healed ulcer on the

legs, and stasis dermatitis.  Gastrointestinal and deep venous thrombosis

prophylaxis.  Repeat labs.  Discussion done with ER physician.  Consult

called with Dr. Kirk Gallegos for paracentesis.  We will follow up.





__________________________________________

Estela Stein MD



DD:  12/25/2017 16:58:21

DT:  12/25/2017 22:08:18

Job # [id number]



KARTHIK

## 2017-12-26 NOTE — CON
DATE:  12/25/2017



PULMONARY CONSULTATION



REFERRING PHYSICIAN:  Dr. Stein



REASON FOR CONSULT:  Sleep apnea syndrome, chronic lung disease.



HISTORY OF PRESENT ILLNESS:  This is a 57-year-old gentleman with past

medical history significant for chronic obstructive lung disease, history

of respiratory failure in the past, chronic leg edema, history of

peripheral neuropathy, anemia, cirrhotic liver, ascites, degenerative joint

disease, herniated disk, spinal stenosis, has a history of paracentesis in

the past, decompensated liver, scrotal edema, anxiety, depression, history

of suicidal ideation in the past, comes in the ER with shortness breath,

cough, leg swelling, received diuretics, feels better.  No hemoptysis.  No

hematemesis.  No hematuria.  No diarrhea reported.



PAST MEDICAL HISTORY:  As per history of present illness.



FAMILY HISTORY:  Significant cardiopulmonary disease is reported.



SOCIAL HISTORY:  Active smoker, drinks excessive alcohol use.



ALLERGIES:  NONE KNOWN.



MEDICATIONS:  He is on Dilaudid 0.25 mg q. 6 hours p.r.n., folic acid 1 mg

daily, Klonopin 0.25 mg twice a day, Lasix 20 mg daily, Neurontin 600 mg

twice a day, Seroquel 200 mg daily.



REVIEW OF SYSTEMS:  No headache or rhinitis.  Has cough, shortness of

breath, chest pain.  No nausea, no vomiting.  No diarrhea.  Has increased

abdominal girth, increased leg swelling.  Admitted snoring, known sleep

apnea syndrome.



PHYSICAL EXAMINATION:

GENERAL:  Mild-to-moderate distress.

VITAL SIGNS:  Temperature is 98, heart rate 101, respiratory rate is 20,

blood pressure 141/91, pulse oximetry 98% on 2 liters nasal cannula.

HEENT:  Moist mucous membrane.  Crowded airway.  Mallampati score is 4.

NECK;  Supple.  No JVD.

LUNGS:  Has scattered rhonchi and wheezing.

HEART:  S1 and S2.

ABDOMEN:  Has ascites, distended.

EXTREMITIES:  Has significant swelling.

NEUROLOGIC:  Awake and alert, follows simple commands.



LABORATORY DATA:  Shows hemoglobin 14.9, hematocrit 47.1, WBC 6.1, platelet

count is 151.  INR 1.49, PTT 34.  Has a VBG done which shows pH 7.36, pCO2

55, O2 is 23.  Sodium 142, potassium 3.8, chloride 100, bicarbonate 29, BUN

9, creatinine 0.9, glucose 116, calcium is 9.7, magnesium 2.1, phosphorus

4.1, total bili 2.0, AST 38, ALT 32, alk phos is 115, ammonia less than 19.

ProBNP 1020, albumin 4.5.  Troponin less than 0.01.  Alcohol level less

than 10.  Has abdominal ultrasound done, shows hepatosplenomegaly without

focal abnormality, intra-abdominal ascites.  Chest x-ray done in ER shows

right lower lobe infiltrate and atelectasis.



IMPRESSION AND PLAN:  Pneumonia, morbid obesity, sleep apnea syndrome,

cirrhotic liver with ascites, obstructive pulmonary disease, history of

pulmonary hypertension, history of anxiety, depression, history of suicidal

ideation in the past.  We will start antibiotics, IV and inhaled

bronchodilators, pain management, diuretics.  May benefit from therapeutic

paracentesis.  The patient has to stop smoking.  We will place him on BiPAP

while sleeping.  Careful with sedation.  Fall precautions.  We will add

thiamine.



Thank you and we will follow with you.







__________________________________________

Tor Jones MD







DD:  12/25/2017 19:17:58

DT:  12/25/2017 19:21:26

Job # [id number]

## 2017-12-26 NOTE — PCM.SURG1
Surgeon's Initial Post Op Note





- Surgeon's Notes


Surgeon: Mata Pruitt MD


Assistant: NONE


Type of Anesthesia: Local


Pre-Operative Diagnosis: Cirrhosis, ascites


Operative Findings: US showed a large amount of ascites


Post-Operative Diagnosis: Cirrhosis, ascites


Operation Performed: US guided paracentesis.


Specimen/Specimens Removed: 6500 cc of straw colored fluid


Estimated Blood Loss: EBL {In ML}: 1


Blood Products Given: N/A


Drains Used: No Drains


Post-Op Condition: Fair


Date of Surgery/Procedure: 12/26/17


Time of Surgery/Procedure: 12:45

## 2017-12-26 NOTE — CP.PCM.CON
<Ludwig Sanders - Last Filed: 12/26/17 23:36>





History of Present Illness





- History of Present Illness


History of Present Illness: 


Podiatry Consult Note- Dr. Buckley





57 y.o male seen at bedside with attending Dr. Buckley for bilaterally leg 

edema. Patient is known to the podiatry service and to Dr. Buckley. Patient is 

seen resting comfortably in bed, in NAD, and AA0x3. Denies any acute overnight 

events. Reports SOB. Patient also reports lower leg swelling and redness. 

Patient denies n/v/cp/chills/f or d.





Past Patient History





- Infectious Disease


Hx of Infectious Diseases: None





- Tetanus Immunizations


Tetanus Immunization: Unknown





- Past Social History


Smoking Status: Current Some Days Smoker





- CARDIAC


Hx Cardiac Disorders: Yes


Hx Circulatory Problems: Yes


Hx Peripheral Edema: Yes





- PULMONARY


Hx Respiratory Disorders: Yes


Hx Asthma: Yes


Hx Chronic Obstructive Pulmonary Disease (COPD): Yes


Other/Comment: current smoker





- NEUROLOGICAL


Hx Neurological Disorder: Yes


Other/Comment: neuropathy





- HEENT


Hx HEENT Problems: Yes (wears eyeglassees)





- HEMATOLOGICAL/ONCOLOGICAL


Hx Blood Disorders: Yes


Hx Anemia: Yes


Hx Cirrhosis: Yes





- INTEGUMENTARY


Hx Dermatological Problems: Yes


Other/Comment: muiltiple healed leg wounds ble,GROSS EDEMA,HAS ANASARCA,

BILATERAL LE HAS THIN TO THICKENED SKIN FLAKES. EDEMA +4.ABDOMEN HAS ASCITES.





- MUSCULOSKELETAL/RHEUMATOLOGICAL


Hx Arthritis: Yes


Hx Back Pain: Yes


Hx Falls: No


Hx Herniated Disk: Yes


Hx Spinal Stenosis: Yes


Hx Unsteady Gait: Yes (cane)





- GASTROINTESTINAL


Hx Gastrointestinal Disorders: Yes (ascites; no paracentesis in the past)


Hx Liver Failure: Yes





- GENITOURINARY/GYNECOLOGICAL


Hx Genitourinary Disorders: Yes (swollen testicles)





- PSYCHIATRIC


Hx Psychophysiologic Disorder: Yes


Hx Anxiety: Yes


Hx Depression: Yes


Hx Physical Abuse: No


Hx Substance Use: No


Other/Comment: ETOH





- SURGICAL HISTORY


Hx Surgeries: No





- ANESTHESIA


Hx Anesthesia: No





Meds


Allergies/Adverse Reactions: 


 Allergies











Allergy/AdvReac Type Severity Reaction Status Date / Time


 


No Known Allergies Allergy   Verified 12/25/17 13:58














- Medications


Medications: 


 Current Medications





Azithromycin (Zithromax)  500 mg PO DAILY [facility]


   PRN Reason: Protocol


   Last Admin: 12/26/17 09:40 Dose:  500 mg


Clonazepam (Klonopin)  0.25 mg PO BID [facility]


   PRN Reason: Protocol


   Last Admin: 12/26/17 17:03 Dose:  0.25 mg


Folic Acid (Folic Acid)  1 mg PO DAILY [facility]


   Last Admin: 12/26/17 09:40 Dose:  1 mg


Furosemide (Lasix)  40 mg IVP Q12 [facility]


   Last Admin: 12/26/17 22:15 Dose:  40 mg


Gabapentin (Neurontin)  600 mg PO BID [facility]


   PRN Reason: Protocol


   Last Admin: 12/26/17 17:03 Dose:  600 mg


Home Med (Home Med)  1 unit IH DAILY [facility]


   Last Admin: 12/26/17 09:41 Dose:  1 unit


Home Med (Home Med)  1 unit IH DAILY [facility]


   Last Admin: 12/26/17 09:41 Dose:  1 unit


Home Med (Home Med)  1 unit IH DAILY [facility]


   Last Admin: 12/26/17 09:41 Dose:  1 unit


Hydromorphone HCl (Dilaudid)  0.25 mg IVP Q6H [facility]


   PRN Reason: Pain, severe (8-10)


   Last Admin: 12/26/17 20:30 Dose:  0.25 mg


Meropenem (Merrem Iv 1 Gm Premix)  50 mls @ 100 mls/hr IVPB Q8 [facility]


   PRN Reason: Protocol


   Stop: 01/04/18 22:01


   Last Admin: 12/26/17 22:22 Dose:  100 mls/hr


Methylprednisolone (Solu-Medrol)  30 mg IVP Q12 [facility]


   Last Admin: 12/26/17 22:21 Dose:  30 mg


Quetiapine Fumarate (Seroquel)  200 mg PO DAILY [facility]


   Last Admin: 12/26/17 09:39 Dose:  200 mg


Thiamine HCl (Vitamin B1 Tab)  100 mg PO DAILY [facility]


   Last Admin: 12/26/17 09:40 Dose:  100 mg











Physical Exam





- Constitutional


Appears: Well, Non-toxic, No Acute Distress





- Extremities Exam


Additional comments: 





Vasc: DP and PT 2/4 bilaterally, CFT < 3 seconds to digits, temperature 

gradient WNL, pitting edema noted to the LE


Ortho: tenderness with palpation to the LE, MM is 5/5 in all four compartments: 

dorsiflexion, plantarflexion, inversion, eversion


Neuro: gross and protective sensation diminished


Derm: erythema noted to the entire LE bilaterally,  xerosis to the LE noted, no 

open lesions noted, no active drainage, no tunneling, no undermining no probe 

to bone 


Elongated, hypertrophic nails x10 with yellow discoloration and subungal debris 

noted





- Neurological Exam


Neurological exam: Alert, Oriented x3





- Psychiatric Exam


Psychiatric exam: Normal Affect, Normal Mood





Results





- Vital Signs


Recent Vital Signs: 


 Last Vital Signs











Temp  98.3 F   12/26/17 16:00


 


Pulse  98 H  12/26/17 23:06


 


Resp  20   12/26/17 16:00


 


BP  120/81   12/26/17 22:15


 


Pulse Ox  98   12/26/17 16:00














- Labs


Result Diagrams: 


 12/26/17 06:40





 12/26/17 06:40


Labs: 


 Laboratory Results - last 24 hr











  12/26/17 12/26/17 12/26/17





  06:40 06:40 06:40


 


WBC   5.6 


 


RBC   5.14 


 


Hgb   14.2 


 


Hct   45.7 


 


MCV   88.9 


 


MCH   27.6 


 


MCHC   31.1 


 


RDW   14.4 


 


Plt Count   162 


 


MPV   10.7 


 


PT   


 


INR   


 


Sodium  142  


 


Potassium  4.7  


 


Chloride  100  


 


Carbon Dioxide  33  


 


Anion Gap  14  


 


BUN  11  


 


Creatinine  0.9  


 


Est GFR ( Amer)  > 60  


 


Est GFR (Non-Af Amer)  > 60  


 


Random Glucose  134 H  


 


Calcium  9.1  


 


Total Bilirubin  1.7 H  


 


Direct Bilirubin  0.8 H  


 


AST  39  


 


ALT  32  


 


Alkaline Phosphatase  94  


 


NT-Pro-B Natriuret Pep  782 H  


 


Total Protein  7.6  


 


Albumin  4.1  


 


Globulin  3.5  


 


Albumin/Globulin Ratio  1.2  


 


Triglycerides  71  


 


Cholesterol  133  


 


LDL Cholesterol Direct  77  


 


HDL Cholesterol  41  


 


Vitamin B12  673  


 


Folate  > 20.0  


 


TSH 3rd Generation    3.20


 


Fluid Source   


 


Fluid Appearance   


 


Fluid WBC   


 


Fluid RBC   


 


Fluid Tot Cell Count   


 


Fluid Neutrophils   


 


Fluid Lymphocytes   


 


Fld Monocyte/Macrophag   


 


Fluid Comment   














  12/26/17 12/26/17





  06:40 12:20


 


WBC  


 


RBC  


 


Hgb  


 


Hct  


 


MCV  


 


MCH  


 


MCHC  


 


RDW  


 


Plt Count  


 


MPV  


 


PT  16.9 H 


 


INR  1.52 H 


 


Sodium  


 


Potassium  


 


Chloride  


 


Carbon Dioxide  


 


Anion Gap  


 


BUN  


 


Creatinine  


 


Est GFR ( Amer)  


 


Est GFR (Non-Af Amer)  


 


Random Glucose  


 


Calcium  


 


Total Bilirubin  


 


Direct Bilirubin  


 


AST  


 


ALT  


 


Alkaline Phosphatase  


 


NT-Pro-B Natriuret Pep  


 


Total Protein  


 


Albumin  


 


Globulin  


 


Albumin/Globulin Ratio  


 


Triglycerides  


 


Cholesterol  


 


LDL Cholesterol Direct  


 


HDL Cholesterol  


 


Vitamin B12  


 


Folate  


 


TSH 3rd Generation  


 


Fluid Source   Peritoneal


 


Fluid Appearance   Sl cloudy


 


Fluid WBC   509.0 H


 


Fluid RBC   2614.0 H


 


Fluid Tot Cell Count   100 H


 


Fluid Neutrophils   8.1 H


 


Fluid Lymphocytes   91.9 H


 


Fld Monocyte/Macrophag   TEST NOT PERFORMED


 


Fluid Comment   TEST NOT PERFORMED














Assessment & Plan





- Assessment and Plan (Free Text)


Assessment: 





57 y.o male wit bilaterally leg erythema with swelling and onychomycosis


Plan: 





Patient examined and evaluated at bedside with attending Dr. Buckley


Charts, labs, vitals reviewed (afebrile, WBC=5.6 absent leukocytosis)


Discussed the plan in detail with attending


LE is cleansed with saline solution and pat dry with gauze


Lotrisone cream ordered- will apply daily to LE


Will debride enlongated, hypertrophic nails tomorrow


Podiatry will continue to follow while in house





<Evy Buckley - Last Filed: 01/04/18 14:16>





Results





- Vital Signs


Recent Vital Signs: 


 Last Vital Signs











Temp  98.5 F   01/02/18 15:51


 


Pulse  70   01/02/18 15:51


 


Resp  20   01/02/18 15:51


 


BP  134/82   01/02/18 15:51


 


Pulse Ox  96   01/02/18 15:51














- Labs


Result Diagrams: 


 01/02/18 06:20





 01/02/18 06:20





Attending/Attestation





- Attestation


I have personally seen and examined this patient.: Yes


I have fully participated in the care of the patient.: Yes


I have reviewed all pertinent clinical information: Yes

## 2017-12-26 NOTE — US
Date of Procedure: 12/26/2017



PROCEDURE: Ultrasound-guided paracentesis, CPT 71223



Medications: 7 cc 1% Lidocaine







HISTORY:

Ascites, abdominal pain, cirrhosis



TECHNIQUE:





Following informed consent , the patient was placed supine on the 

stretcher and the site was marked. A limited abdominal ultrasound was 

performed that showed a large amount of intra-abdominal fluid. 

Procedural time out was called and the Pt's abdomen was marked and 

prepped and draped in the usual sterile fashion. Ultrasound-guided 

large volume paracentesis performed.  A total of 6.5 liters of straw 

colored fluid was removed without complication. 



IMPRESSION:





Ultrasound-guided large volume paracentesis.

## 2017-12-26 NOTE — US
HISTORY:

Leg pain and swelling. Evaluate for DVT



PHYSICIAN(S):  Kirk Gallegos MD.



TECHNIQUE:

Duplex sonography and color-flow Doppler with graded compression were 

used to evaluate the deep venous systems of both lower extremities. 

The exam is limited by body habitus and edema. The tibial veins are 

not well seen



FINDINGS:

The visualized deep venous systems of both lower extremities are 

sonographically normal and compressible. Normal wave forms and 

augmentation are seen. There is no sonographic evidence for deep 

venous thrombosis in the visualized segments of both lower 

extremities.



IMPRESSION:

No sonographic evidence for deep venous thrombosis in the visualized 

segments of both lower extremities. 



Limited study

## 2017-12-27 LAB
ALBUMIN SERPL-MCNC: 4 G/DL (ref 3–4.8)
ALBUMIN/GLOB SERPL: 1.2 {RATIO} (ref 1.1–1.8)
ALT SERPL-CCNC: 32 U/L (ref 7–56)
AST SERPL-CCNC: 35 U/L (ref 17–59)
BUN SERPL-MCNC: 15 MG/DL (ref 7–21)
CALCIUM SERPL-MCNC: 8.9 MG/DL (ref 8.4–10.5)
GFR NON-AFRICAN AMERICAN: > 60

## 2017-12-27 RX ADMIN — HYDROMORPHONE HYDROCHLORIDE PRN MG: 1 INJECTION, SOLUTION INTRAMUSCULAR; INTRAVENOUS; SUBCUTANEOUS at 12:31

## 2017-12-27 RX ADMIN — METHYLPREDNISOLONE SODIUM SUCCINATE SCH MG: 40 INJECTION, POWDER, FOR SOLUTION INTRAMUSCULAR; INTRAVENOUS at 20:02

## 2017-12-27 RX ADMIN — MEROPENEM SCH: 1 INJECTION INTRAVENOUS at 20:02

## 2017-12-27 RX ADMIN — HYDROMORPHONE HYDROCHLORIDE PRN MG: 1 INJECTION, SOLUTION INTRAMUSCULAR; INTRAVENOUS; SUBCUTANEOUS at 20:37

## 2017-12-27 RX ADMIN — METHYLPREDNISOLONE SODIUM SUCCINATE SCH MG: 40 INJECTION, POWDER, FOR SOLUTION INTRAMUSCULAR; INTRAVENOUS at 09:22

## 2017-12-27 RX ADMIN — CLOTRIMAZOLE AND BETAMETHASONE DIPROPIONATE SCH APPFUL: 10; .5 CREAM TOPICAL at 17:24

## 2017-12-27 RX ADMIN — METHYLPREDNISOLONE SODIUM SUCCINATE SCH: 40 INJECTION, POWDER, FOR SOLUTION INTRAMUSCULAR; INTRAVENOUS at 21:15

## 2017-12-27 RX ADMIN — CLOTRIMAZOLE AND BETAMETHASONE DIPROPIONATE SCH APPFUL: 10; .5 CREAM TOPICAL at 10:24

## 2017-12-27 RX ADMIN — MEROPENEM SCH MLS/HR: 1 INJECTION INTRAVENOUS at 13:59

## 2017-12-27 RX ADMIN — MEROPENEM SCH MLS/HR: 1 INJECTION INTRAVENOUS at 05:22

## 2017-12-27 RX ADMIN — MEROPENEM SCH MLS/HR: 1 INJECTION INTRAVENOUS at 20:02

## 2017-12-27 NOTE — PN
DATE:  12/27/2017



SUBJECTIVE:  The patient is seen early this morning in room 561, bed 2.  No

fevers reported, uneventful night.



PHYSICAL EXAMINATION

VITAL SIGNS:  Temperature of 97, blood pressure is 120/70 and respiratory

rate of 22.

HEENT:  Unremarkable.

NECK:  Supple.

LUNGS:  Have decreased breath sounds.

HEART:  Normal S1 and S2.

ABDOMEN:  Soft, distended and mild tenderness.  No rebound or guarding.



LABORATORY EXAMINATION:  Reveals a white count of 6.1, hemoglobin of 14 and

platelets of 162.  BUN of 15 and creatinine of 0.8.  Urinalysis is noted. 

Peritoneal fluid shows 509 wbc's.  HIV is nonreactive.  Urine for

Legionella antigen is negative.  Microbiology reveals the peritoneal

cultures have no growth and no organisms seen on the Gram stain.  Sputum

culture is pending.



ASSESSMENT AND PLAN:  A 57-year-old with morbid obesity with body mass

index of 46, spinal stenosis, arthritis, degenerative joint disease, disk

disease, liver disease, depression, anxiety, cirrhosis of the liver,

chronic obstructive lung disease, peripheral neuropathy, anemia presenting

with tachycardia and dyspnea:

1.  Sepsis with spontaneous bacterial peritonitis with elevated wbc's in

the peritoneal fluid associated with community-acquired pneumonia.  The

patient has congestive heart failure, elevated BNP and edema, and 56%

ejection fraction approximately 2 years ago with acute diastolic congestive

heart failure on top of chronic congestive heart failure.  Currently on

meropenem and Zithromax, day #2.  We will follow closely with you.







__________________________________________

Corbin Vergara MD





DD:  12/27/2017 20:52:13

DT:  12/27/2017 21:48:07

Job # [id number]

## 2017-12-27 NOTE — CON
DATE:  12/26/2017



Patient is seen on 561, bed 2.



CHIEF COMPLAINT:  Generalized weakness and shortness of breath and

increased abdominal girth.



HISTORY OF PRESENT ILLNESS:  This is a 57-year-old morbidly obese male with

a BMI of 46, history of spinal stenosis, degenerative joint disease,

arthritis, disk disease, liver disease, cirrhosis of the liver from alcohol

abuse, chronic obstructive lung disease from smoking, peripheral

neuropathy, anxiety, depression, anemia who was admitted with shortness of

breath.  Patient states that he has increased abdominal girth and increased

lower extremity edema.  He had low grade fevers and occasional chills.  He

has no chest pain.  No hemoptysis.  No headaches or blurred vision.



PAST MEDICAL HISTORY:  Significant for morbid obesity, BMI of 46, spinal

stenosis, arthritis, degenerative joint disease, disc disease, liver

disease, chronic obstructive lung disease, depression, peripheral

neuropathy, anxiety, anemia.



PAST SURGICAL HISTORY:  Noncontributory.



ALLERGIES:  PATIENT HAS NO KNOWN ALLERGIES.



SOCIAL HISTORY:  Patient is an alcohol abuser and smoker.



PHYSICAL EXAMINATION:

GENERAL:  Patient is in bed, appearing uncomfortable due to his very large

size.

VITAL SIGNS:  Temperature of 98, blood pressure is 120/70, respiratory rate

of 27, heart rate of 76, it was up to 95 to 101.  Blood pressure earlier

was 113/50.

HEENT:  Unremarkable.

NECK:  Supple.

LUNGS:  Have decreased breath sounds.

HEART:  Normal S1, S2.

ABDOMEN:  Soft, nontender.  Examination of the abdomen is distended.

EXTREMITIES:  Lower extremity edema.



Laboratory examination reveals a white count of 6.1, hemoglobin of 14,

platelets of 151, coagulation is noted.  Chemistries are reviewed. 

Patient's BUN of 11, creatinine of 0.9 and BNP is 782,.  Patient had

peritoneal fluid evaluation which had over 509 WBCs and 8% neutrophils, 91%

of lymphocytes.  Laboratory reveals chest x-ray to be positive for an

infiltrate.



ASSESSMENT AND PLAN:  This is a 57-year-old morbidly obese male with a BMI

of 46 with spinal stenosis, arthritis, degenerative joint disease, disc

disease, liver disease, depression, anxiety with cirrhosis of the liver,

chronic obstructive lung disease, peripheral neuropathy, anemia, presenting

with tachycardia, dyspnea.

1.  Sepsis with spontaneous bacterial peritonitis with elevated WBCs in the

peritoneal fluid with community-acquired pneumonia in a patient who has

congestive heart failure with an elevated BNP and edema in a patient with a

history of 56% ejection fraction, approximately 2 years ago with acute

diastolic congestive heart failure on top of chronic congestive heart

failure in this patient.  We will treat the patient with meropenem and

patient is already on azithromycin.  Pending blood culture, urine cultures,

sputum culture, urine for Legionella antigen and pan culture results and

because of his age, we will order an HIV test and peritoneal fluid culture

and Gram stain pending.  We will follow closely with you





__________________________________________

Corbin Vergara MD





DD:  12/26/2017 19:36:00

DT:  12/26/2017 19:38:42

Job # [id number]

## 2017-12-27 NOTE — PN
PULMONARY PROGRESS NOTE



DATE:  12/27/2017



REFERRING PHYSICIAN:  Estela Stein MD



SUBJECTIVE:  He is lying in the bed.  Night was unremarkable.  Tolerated

BiPAP well.  Still has some cough and shortness of breath.  No nausea.  No

vomiting.  No diarrhea.  Has a leg swelling.



OBJECTIVE:

GENERAL:  In no acute distress.

VITAL SIGNS:  Temperature is 98, heart is 84, respiratory rate is 22, blood

pressure is 122/74, and pulse ox is 96% on room air.

HEENT:  Moist mucous membrane.  Crowded airway.  Mallampati score is IV.

NECK:  Supple.  No JVD.

LUNGS:  Has a scattered rhonchi.

HEART:  S1 and S2.

ABDOMEN:  Soft and nontender.

EXTREMITIES:  Still has edema and erythema.

NEUROLOGIC:  Awake and alert and follows simple commands.



MEDICATIONS:  He is on Dilaudid 0.25 mg IV q.6 hours p.r.n., folic acid 1

mg daily, he is also on Klonopin 0.25 mg twice a day, Lasix 40 mg twice a

day, Lotrisone affected area twice a day, meropenem 1 g IV q.8 hours,

Neurontin 600 mg twice a day, Seroquel 100 mg at bedtime, Solu-Medrol 30 mg

q.12 hours, vitamin B1 100 mg daily, and Zithromax 500 mg daily.



LABORATORY DATA:  Shows sodium 143, potassium 4.1, chloride 102, and

bicarbonate 30.  BUN 15, creatinine is 0.8, glucose 138, calcium is 8.9,

AST 35, ALT 32, alk phos is 88, and albumin is 4.0.



IMPRESSION AND PLAN:  Pneumonia, morbid obesity, sleep apnea syndrome,

cirrhotic liver, ascites, obstructive pulmonary disease, pulmonary

hypertension, anxiety disorder, depression, history of suicidal ideation in

the past, and large ascites, status post volume paracentesis.  Pulmonary

point of view, doing well.  Continue antibiotics and bronchodilator.  Keep

head at 45 degrees, BiPAP use, gastric prophylaxis, deep venous thrombosis

prophylaxis, out of bed to chair, fall precaution, and follow up labs in

the morning.

















Thank you and we will follow with you.





__________________________________________

Tor Jones MD





DD:  12/27/2017 14:59:08

DT:  12/27/2017 18:16:38

Job # [id number]

## 2017-12-27 NOTE — CP.PCM.PN
<Ludwig Sanders - Last Filed: 12/27/17 18:10>





Subjective





- Date & Time of Evaluation


Date of Evaluation: 12/27/17


Time of Evaluation: 18:10





- Subjective


Subjective: 





Podiatry Progress Note- Dr. Buckley





57 y.o male seen at bedside with attending Dr. Buckley for bilaterally leg 

edema. Patient is seen resting comfortably in bed, in NAD, and AA0x3. Denies 

any acute overnight events. Patient is seen with compression stocking on at 

time of visitation. Patient complains of painful elongated nails x 10. Patient 

denies n/v/cp/chills/f or d. Patient reports the same SOB.





Objective





- Vital Signs/Intake and Output


Vital Signs (last 24 hours): 


 











Temp Pulse Resp BP Pulse Ox


 


 97.6 F   83   20   101/38 L  96 


 


 12/27/17 16:00  12/27/17 16:00  12/27/17 16:00  12/27/17 16:00  12/27/17 16:00








Intake and Output: 


 











 12/27/17 12/27/17





 06:59 18:59


 


Intake Total 780 


 


Balance 780 














- Medications


Medications: 


 Current Medications





Azithromycin (Zithromax)  500 mg PO DAILY [facility]


   PRN Reason: Protocol


   Last Admin: 12/27/17 09:22 Dose:  500 mg


Betamethasone/Clotrimazole (Lotrisone)  0 gm TOP BID [facility]


   Last Admin: 12/27/17 17:24 Dose:  1 appful


Clonazepam (Klonopin)  0.25 mg PO BID [facility]


   PRN Reason: Protocol


   Last Admin: 12/27/17 17:23 Dose:  0.25 mg


Folic Acid (Folic Acid)  1 mg PO DAILY [facility]


   Last Admin: 12/27/17 09:21 Dose:  1 mg


Furosemide (Lasix)  40 mg IVP Q12 [facility]


   Last Admin: 12/27/17 09:24 Dose:  40 mg


Gabapentin (Neurontin)  600 mg PO BID [facility]


   PRN Reason: Protocol


   Last Admin: 12/27/17 17:23 Dose:  600 mg


Home Med (Home Med)  1 unit IH DAILY [facility]


   Last Admin: 12/27/17 09:23 Dose:  1 unit


Home Med (Home Med)  1 unit IH DAILY [facility]


   Last Admin: 12/27/17 09:23 Dose:  1 unit


Home Med (Home Med)  1 unit IH DAILY [facility]


   Last Admin: 12/27/17 09:23 Dose:  1 unit


Hydromorphone HCl (Dilaudid)  0.25 mg IVP Q6H PRN


   PRN Reason: Pain, severe (8-10)


   Last Admin: 12/27/17 12:31 Dose:  0.25 mg


Meropenem (Merrem Iv 1 Gm Premix)  50 mls @ 100 mls/hr IVPB Q8 [facility]


   PRN Reason: Protocol


   Stop: 01/04/18 22:01


   Last Admin: 12/27/17 13:59 Dose:  100 mls/hr


Methylprednisolone (Solu-Medrol)  30 mg IVP Q12 [facility]


   Last Admin: 12/27/17 09:22 Dose:  30 mg


Quetiapine Fumarate (Seroquel)  100 mg PO HS [facility]


   PRN Reason: Protocol


Thiamine HCl (Vitamin B1 Tab)  100 mg PO DAILY [facility]


   Last Admin: 12/27/17 09:22 Dose:  100 mg











- Labs


Labs: 


 





 12/26/17 06:40 





 12/27/17 06:30 





 











PT  16.9 SECONDS (9.4-12.5)  H  12/26/17  06:40    


 


INR  1.52  (0.93-1.08)  H  12/26/17  06:40    


 


APTT  33.6 Seconds (25.1-36.5)   12/25/17  10:10    














- Constitutional


Appears: Well, Non-toxic, No Acute Distress





- Extremities Exam


Additional comments: 





Vasc: DP and PT 2/4 bilaterally, CFT < 3 seconds to digits, temperature 

gradient WNL, pitting edema noted to the LE


Ortho: tenderness with palpation to the LE, MM is 5/5 in all four compartments: 

dorsiflexion, plantarflexion, inversion, eversion


Neuro: gross and protective sensation diminished


Derm: erythema noted to the entire LE bilaterally, has diminished since 

yesterday, xerosis to the LE noted, no open lesions noted, no active drainage, 

no tunneling, no undermining no probe to bone 


Elongated, hypertrophic nails x10 with yellow discoloration and subungal debris 

noted








- Neurological Exam


Neurological Exam: Alert, Awake, Oriented x3





- Psychiatric Exam


Psychiatric exam: Normal Affect, Normal Mood





Assessment and Plan





- Assessment and Plan (Free Text)


Assessment: 








57 y.o male wit bilaterally leg erythema with swelling and onychomycosis


Plan: 








Patient examined and evaluated at bedside with attending Dr. Buckley


Charts, labs, vitals reviewed (afebrile, WBC=5.6 on 12/26/17 absent leukocytosis

)


Discussed the plan in detail with attending


LE is cleansed with saline solution and pat dry with gauze


Lotrisone cream ordered- will apply daily to LE


Patient's enlongated, mycotic dystrophic nails x10 were sharply debrided to 

normal thickness and length with a nail nipper without incident. Patient 

tolerated the procedure well.


Podiatry will continue to follow while in house








<Evy Buckley - Last Filed: 01/04/18 14:17>





Objective





- Vital Signs/Intake and Output


Vital Signs (last 24 hours): 


 











Temp Pulse Resp BP Pulse Ox


 


 98.5 F   70   20   134/82   96 


 


 01/02/18 15:51  01/02/18 15:51  01/02/18 15:51  01/02/18 15:51  01/02/18 15:51











- Labs


Labs: 


 





 01/02/18 06:20 





 01/02/18 06:20 





 











PT  16.9 SECONDS (9.4-12.5)  H  12/26/17  06:40    


 


INR  1.52  (0.93-1.08)  H  12/26/17  06:40    


 


APTT  33.6 Seconds (25.1-36.5)   12/25/17  10:10    














Attending/Attestation





- Attestation


I have personally seen and examined this patient.: Yes


I have fully participated in the care of the patient.: Yes


I have reviewed all pertinent clinical information, including history, physical 

exam and plan: Yes

## 2017-12-27 NOTE — CON
DATE:  12/26/2017



REASON FOR CONSULTATION:  ascites, probably cirrhosis of the liver.



HISTORY OF PRESENT ILLNESS:  This 57-year-old patient with chronic liver

disease and history of active alcohol use, was brought to the hospital for

complaints of worsening of shortness of breath for 4 to 5 days.  Also

noticed to have increased swelling of the legs and abdominal distention. 

The patient was found to have a large ascites.  The patient is admitted for

large volume paracentesis, 6500 mL of ascitic fluid drained.  The patient

is feeling much better now.  Denies any bleeding per rectum, vomiting

blood.



PAST MEDICAL HISTORY:  Other past medical history is significant as above,

history of cirrhosis, degenerative joint disease, history of inguinal

hernias, and history of lower extremity cellulitis.



ALLERGIES:  NO KNOWN DRUG ALLERGY.



SOCIAL HISTORY:  Positive for active alcohol use.  Positive for smoking.



REVIEW OF SYSTEMS:  Positive as above.  Other systems reviewed negative.



PHYSICAL EXAMINATION:

GENERAL:  The patient is lying on the bed, not in acute distress.

VITAL SIGNS:  Temperature is 98.3, blood pressure 113/54, pulse 90, and

respirations 20.  O2 saturation is 98%.

HEENT:  Atraumatic, anicteric.

NECK:  Supple.

HEART:  S1, S2 heard.

LUNGS:  Bilateral air entry present.

ABDOMEN:  Soft.  There is no mass palpable.  The ascites even now is

slightly distended.

EXTREMITIES:  Bilateral edema present.

NEUROLOGICAL:  Alert, oriented, moves all the extremities.  No obvious

asterixis noticed.



LABORATORY DATA:  Hemoglobin 14.2, hematocrit 45.7, WBC 5.6, platelets

162,000.  Chemistry showed total bilirubin 1.7, direct bilirubin 0.8, other

labs are okay.  The patient did have an ultrasound of the abdomen, which

showed hepatosplenomegaly with ascites.  Gallbladder normal.



IMPRESSION:  This is a 57-year-old patient with history of active alcohol

use admitted with progressive shortness of breath, large ascites, probably

decompensated cirrhosis.  We would recommend:

1.  The patient was strictly advised to avoid alcohol.

2.  Would request for abdominal Doppler to evaluate the portal vein.

3.  Management of  COPD,STAN Pneumonia . Patient on steroid

4.  Would also request for iron studies and transferrin saturation to rule

out any hemochromatosis in addition would add abdominal Doppler to evaluate

the portal vein.

5.  We will slowly start the patient on diuretics and optimize and titrate

the dose as per the clinical progress.

6. Continue antibiotics  as per ID



Thank you very much for allowing us to participate in the care of the

patient.







__________________________________________

Silviano Bill MD



DD:  12/26/2017 21:53:25

DT:  12/26/2017 22:55:24

Job # [id number]





MTDJASE

## 2017-12-28 RX ADMIN — Medication SCH %: at 12:09

## 2017-12-28 RX ADMIN — METHYLPREDNISOLONE SODIUM SUCCINATE SCH MG: 40 INJECTION, POWDER, FOR SOLUTION INTRAMUSCULAR; INTRAVENOUS at 21:13

## 2017-12-28 RX ADMIN — MEROPENEM SCH MLS/HR: 1 INJECTION INTRAVENOUS at 06:43

## 2017-12-28 RX ADMIN — OXYCODONE HYDROCHLORIDE PRN MG: 10 TABLET ORAL at 21:12

## 2017-12-28 RX ADMIN — METHYLPREDNISOLONE SODIUM SUCCINATE SCH MG: 40 INJECTION, POWDER, FOR SOLUTION INTRAMUSCULAR; INTRAVENOUS at 09:38

## 2017-12-28 RX ADMIN — MEROPENEM SCH MLS/HR: 1 INJECTION INTRAVENOUS at 16:29

## 2017-12-28 RX ADMIN — CLOTRIMAZOLE AND BETAMETHASONE DIPROPIONATE SCH: 10; .5 CREAM TOPICAL at 13:28

## 2017-12-28 RX ADMIN — OXYCODONE HYDROCHLORIDE PRN MG: 10 TABLET ORAL at 14:39

## 2017-12-28 RX ADMIN — MEROPENEM SCH MLS/HR: 1 INJECTION INTRAVENOUS at 21:12

## 2017-12-28 NOTE — PN
DATE:



SUBJECTIVE:  The patient is seen and examined at the bedside, looking

comfortable.  Night was unremarkable , tolerating BiPAP very well,

status post paracentesis.  Cough and shortness of breath is better,

complaining about insomnia.  No nausea, vomiting or diarrhea.  Swelling of

leg is better.  No headache.  No dizziness.



PHYSICAL EXAMINATION

VITAL SIGNS:  Temperature 98, heart rate 84, respiratory rate 22, blood

pressure 120/74, pulse oximetry 96% on room air.

HEENT:  Head:  Normocephalic, atraumatic.  Eyes:  PERRLA.  Extraocular

muscles intact.  Conjunctivae clear.  Nose patent.  Mucous membranes moist.

NECK:  Supple.  No carotid bruits.  No JVD or thyromegaly.

LUNGS:  Have scattered rhonchi.

HEART:  S1 and S2 positive.

ABDOMEN:  Soft, nontender.  No organomegaly.

EXTREMITIES:  Still has trace edema, erythema and massive stasis

dermatitis.

NEUROLOGIC:  The patient is awake and alert, follows simple command.



MEDICATIONS:  Dilaudid, folic acid, Klonopin, Lasix, Lotrisone, meropenem,

Neurontin, Seroquel, Solu-Medrol, vitamins, Zithromax.



LABORATORY DATA:  Sodium 143, potassium 4.1, BUN 15, creatinine 0.8,

glucose 138.  AST 35, ALT 32, albumin of 4.0.



ASSESSMENT AND PLAN:  Mr. Ayanna Rosales is a 57-year-old male with

pneumonia, morbid obesity, ascites, sleep apnea syndrome, cirrhotic liver,

obstructive pulmonary disease, pulmonary hypertension, anxiety, depression,

history of suicidal ideation in the past, large ascites.  Pulmonologist is

on the case.  Continue antibiotics, bronchodilators.  History of ethanol

abuse, urged to quit drinking.  Gastric prophylaxis and deep venous

thrombosis prophylaxis, out of bed, physical therapy.  We will follow.





__________________________________________

Estela Stein MD



DD:  12/27/2017 21:47:11

DT:  12/28/2017 2:47:20

Job # [id number]

MTDD

## 2017-12-28 NOTE — CP.PCM.PN
Subjective





- Date & Time of Evaluation


Date of Evaluation: 12/28/17


Time of Evaluation: 10:49





- Subjective


Subjective: 





Podiatry Progress Note- Dr. Buckley





57 y.o male seen and evaluated at bedside with attending Dr. Buckley for 

bilaterally leg edema. Patient is seen resting comfortably in bed, in NAD, and 

AA0x3. Patient is seen without compression stockings on during visitation. 

Patient reports that he removes the compression when it gets too warm in the 

room. Patient denies any overnight acute events. Patient mentions upper right 

thigh pain. He describes the pain as numbness and tingling with intermittent 

shotting. Patient denies n/v/cp/chills/f or d. Patient reports the same SOB.





Objective





- Vital Signs/Intake and Output


Vital Signs (last 24 hours): 


 











Temp Pulse Resp BP Pulse Ox


 


 97.8 F   62   25 H  122/83   99 


 


 12/28/17 07:30  12/28/17 07:30  12/28/17 07:30  12/28/17 07:30  12/28/17 07:30








Intake and Output: 


 











 12/28/17 12/28/17





 06:59 18:59


 


Intake Total 1050 


 


Balance 1050 














- Medications


Medications: 


 Current Medications





Azithromycin (Zithromax)  500 mg PO DAILY [facility]


   PRN Reason: Protocol


   Last Admin: 12/28/17 09:38 Dose:  500 mg


Betamethasone/Clotrimazole (Lotrisone)  0 gm TOP BID [facility]


   Last Admin: 12/27/17 17:24 Dose:  1 appful


Clonazepam (Klonopin)  0.25 mg PO BID [facility]


   PRN Reason: Protocol


   Last Admin: 12/28/17 09:39 Dose:  0.25 mg


Folic Acid (Folic Acid)  1 mg PO DAILY [facility]


   Last Admin: 12/28/17 09:40 Dose:  1 mg


Furosemide (Lasix)  40 mg IVP Q12H [facility]


   Last Admin: 12/28/17 06:44 Dose:  40 mg


Gabapentin (Neurontin)  600 mg PO BID [facility]


   PRN Reason: Protocol


   Last Admin: 12/28/17 09:40 Dose:  600 mg


Home Med (Home Med)  1 unit IH DAILY [facility]


   Last Admin: 12/27/17 09:23 Dose:  1 unit


Home Med (Home Med)  1 unit IH DAILY [facility]


   Last Admin: 12/27/17 09:23 Dose:  1 unit


Home Med (Home Med)  1 unit IH DAILY [facility]


   Last Admin: 12/27/17 09:23 Dose:  1 unit


Hydromorphone HCl (Dilaudid)  0.25 mg IVP BID PRN


   PRN Reason: Pain, severe (8-10)


   Last Admin: 12/28/17 09:37 Dose:  0.25 mg


Meropenem (Merrem Iv 1 Gm Premix)  50 mls @ 100 mls/hr IVPB Q8 [facility]


   PRN Reason: Protocol


   Stop: 01/04/18 22:01


   Last Admin: 12/28/17 06:43 Dose:  100 mls/hr


Lactic Acid (Lac-Hydrin 12% Lotion (225 G))  0 gm EXT DAILY [facility]


Methylprednisolone (Solu-Medrol)  20 mg IVP Q12 [facility]


   Last Admin: 12/28/17 09:38 Dose:  20 mg


Quetiapine Fumarate (Seroquel)  100 mg PO HS [facility]


   PRN Reason: Protocol


Spironolactone (Aldactone)  50 mg PO DAILY [facility]


   Last Admin: 12/28/17 09:40 Dose:  50 mg


Thiamine HCl (Vitamin B1 Tab)  100 mg PO DAILY [facility]


   Last Admin: 12/28/17 09:39 Dose:  100 mg











- Labs


Labs: 


 





 12/26/17 06:40 





 12/27/17 06:30 





 











PT  16.9 SECONDS (9.4-12.5)  H  12/26/17  06:40    


 


INR  1.52  (0.93-1.08)  H  12/26/17  06:40    


 


APTT  33.6 Seconds (25.1-36.5)   12/25/17  10:10    














- Constitutional


Appears: Well, Non-toxic, No Acute Distress





- Extremities Exam


Additional comments: 








Vasc: DP and PT 2/4 bilaterally, CFT < 3 seconds to digits, temperature 

gradient WNL, pitting edema noted to the LE


Ortho: tenderness with palpation to the LE, MM is 5/5 in all four compartments: 

dorsiflexion, plantarflexion, inversion, eversion


Neuro: gross and protective sensation diminished


Derm: erythema noted to the entire LE bilaterally, has diminished since 

yesterday, xerosis to the LE noted, no open lesions noted, no active drainage, 

no tunneling, no undermining no probe to bone, no open lesions to the LE





- Neurological Exam


Neurological Exam: Alert, Awake, Oriented x3





- Psychiatric Exam


Psychiatric exam: Normal Affect, Normal Mood

## 2017-12-28 NOTE — PN
DATE:



The patient is 57-year-old male.



SUBJECTIVE:  The patient is seen and examined at the bedside.  Looking

comfortable.  No nausea, vomiting, or diarrhea.  No hematuria or

hematochezia.  No fever.  No chills.  Still having big abdomen and

complaining about back pain and sciatica.



PHYSICAL EXAMINATION:

VITAL SIGNS:  Temperature 97.8, pulse 62, blood pressure 123/85, and

respiratory rate is 25.

HEENT:  Head normocephalic and atraumatic.  Eyes:  PERRLA.  Extraocular

muscles intact.  Conjunctivae clear.  Nose patent.  Mucous membranes are

moist.

NECK:  Supple.  No carotid bruits, no JVD or thyromegaly.

CHEST:  Bilaterally symmetrical.

HEART:  S1 and S2 positive.

LUNGS:  Clear to auscultation.

ABDOMEN:  Soft.  Bowel sounds positive.  No organomegaly.

EXTREMITIES:  Trace edema and redness.  Podiatry is on the case.

NEUROLOGIC:  The patient is awake and alert.  Moving all four extremities. 

No focal deficits.



MEDICATIONS:  Aldactone, Cymbalta, folic acid, Klonopin, lactic acid,

Lasix, Lotrisone, meropenem, Neurontin, oxycodone, Seroquel,

methylprednisolone, thiamine, Zithromax.



LABORATORY DATA:  White blood cell 5.3, hemoglobin 14.2, hematocrit 45.7,

and platelets 152.  Sodium 143, potassium 4.1, BUN 15, creatinine 0.8,

glucose 138.



ASSESSMENT AND PLAN:  Mr. Ayanna Rosales is a 57-year-old male with

hyperglycemia, abnormal liver function test, cellulitis of the leg, history

of stasis dermatitis, ascites, status post paracentesis, cirrhosis of the

liver, comorbid obesity, body mass index is 46, spinal stenosis, arthritis,

degenerative joint disease, disc disease, depression, anxiety, chronic

obstructive lung disease, peripheral neuropathy, sepsis, shortness of

breath, bacterial peritonitis with over 500 white blood cells in the

peritoneal fluid and associated with community-acquired pneumonia, acute

diastolic congestive heart failure on the top of chronic congestive heart

failure, on meropenem and Zithromax, before meropenem is active and

azithromycin..  I reviewed Dr. Vergara's notes, appreciate it.  Seen by

Dr. Jeremy Pérez and Dr. Jones also.  History of sleep apnea.  Pulmonary

hypertension.  We will continue gabapentin, Cymbalta at bedtime for

peripheral neuropathy.  The patient needs physical therapy, needs subacute

rehab in terms of hamstring strengthening as well as high exercise relieve

pressure over the lateral femoral cutaneous nerve and therapeutic

exercises.  Continue treatment of chronic obstructive pulmonary disease. 

Gastrointestinal and deep vein thrombosis prophylaxis.  Repeat labs.  We

will follow up.





__________________________________________

Estela Stein MD



DD:  12/28/2017 20:06:24

DT:  12/28/2017 20:54:48

Job # [id number]

## 2017-12-28 NOTE — CON
DATE:  12/27/2017



He is being seen today for a consultation.



PRESENTATION:  The patient is a 57-year-old  male, appearing older

than his stated age.  He is seen at the side of his hospital bed.  He is

very sedated.  He is lying on his side.  He has large abdominal ascites. 

He was admitted due to increasing girth, difficulty breathing, and lower

extremity swelling.  He continues to be dealing with this while in the

hospital.  He has a history of alcoholism.  The patient indicates that he

is uncomfortable currently physically.  If he is very sedated, he evidently

sees Dr. Guerrero privately for the last 2 years.  He has been hospitalized

one time on the Psychiatric Unit here at [place] and Dr. Guerrero was his

doctor at that time, so we continued with him afterwards.  He indicated, he

was on Klonopin and Seroquel from him and he has been well maintained in

his opinion since then and he plans to continue with Dr. Guerrero.  He was

not pleased on her psychiatric consult indicates that he does not need

that, he has good followup.  He only sees Dr. Guerrero for management.  He

does not see anyone for therapy.  He indicates, he is not depressed and has

no other problems.  He was born in [place], he graduated from high

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  He has been having

difficulty working due to bad back.  He lives with his wife for 30 years

and he indicates his marriage is stable, though, his wife is upset with him

because she feels like his current medical condition is due to the fact

that he was drinking a lot around the holidays.  He has been an alcoholic. 

He indicated since he was 16 or 17 years old, he drinks at least 6 beers

daily for the last 20 years.  He denies any history of blackout, seizures,

or DWIs.  He denies any legal history, ever being to any drug or alcohol

rehab.  He has never used any illicit drugs, and he indicates _____ few

weeks ago but then he reiterates that he did have some drinks over the

holiday last week.



MENTAL STATUS EXAM:  The patient is arousable and semi alert, though he is

sluggish.  He is oriented x3.  He indicates, he likes the feeling of being

sedated, then he feels like he is not crawling up the walls and this is a

good thing while he is in the hospital.  His mood is blunted.  His affect

is constricted.  He denies being suicidal or homicidal.  Denies the

presence of hallucinations, delusions, or paranoia.  His focus and

concentration appears to be adequate.  His memory, both short and long term

has some deficits.  His appetite and his sleep, he indications are normal.



DIAGNOSTIC IMPRESSION:  Mood disorder unspecified, alcohol use disorder,

chronic ongoing peripheral edema, asthma, chronic obstructive pulmonary

disease, peripheral neuropathy, anemia, cirrhosis of the liver, multiple

leg wounds, arthritis, back pain, herniated disk, morbid obesity, spinal

stenosis, status post paracentesis yesterday.



Current vital signs include temperature of 97.6, pulse rate of 83, blood

pressure 101/38, respirations of 20 with an O2 saturation of 96.



Review of cultures indicate at this point, no growth in blood cultures,

body fluid culture, and sputum at this time.



PLAN:  The patient indicates that he is stable, does not want any

psychiatric followup.



MEDICATIONS:  His current medications are Zithromax, Lotrisone, Klonopin

0.25 mg b.i.d., folic acid, furosemide, gabapentin 600 mg one p.o. b.i.d.,

Dilaudid, Seroquel 200 mg one daily.  Seroquel traditionally is better

given in the evening because it has a high sedative value; this patient is

pretty sedated during the day and he is already unsteady on his feet,

probably better idea would be to lower the dose from 200 mg daily to 100 mg

one at bedtime which was done.



Please call if there are any further problems or concerns of this patient.





__________________________________________

RENEE Diaz



__________________________________________

Sherman Castillo MD



DD:  12/27/2017 22:50:43

DT:  12/28/2017 2:31:28

Job # [id number]

[email or web address]

## 2017-12-28 NOTE — PN
DATE:  12/28/2017



SUBJECTIVE:  The patient is in bed in no acute distress, nontoxic.



OBJECTIVE:

VITAL SIGNS:  Temperature is 97, blood pressure is 120/70, respiratory rate

is 22, and heart rate of 102.

HEENT:  Unremarkable.

NECK:  Supple.

LUNGS:  Have decreased breath sounds.

HEART:  Normal S1 and S2.

ABDOMEN:  Soft and nontender.



LABORATORY DATA:  Reveals the peritoneal fluid cultures are negative. 

Blood cultures are negative.  Sputum cultures are pending.  White count of

5.6, BUN of 15, and creatinine of 0.8.  The patient's coagulation reveals

the patient's INR is 1.52.  The patient had Dopplers of lower extremities

are negative for DVT in both lower extremities.



ASSESSMENT AND PLAN:  This is a 57-year-old male with morbid obesity, body

mass index of 46, spinal stenosis, arthritis, degenerative joint disease,

disk disease, liver disease, depression, anxiety, cirrhosis of the liver,

chronic obstructive lung disease, peripheral neuropathy, and anemia,

presenting on this admission:

1.  Sepsis with spontaneous bacterial peritonitis with over 500 WBCs in the

peritoneal fluid and associated with a community-acquired pneumonia,

congestive heart failure and with acute diastolic congestive heart failure

on top of chronic congestive heart failure, on meropenem and Zithromax day

#3.  Meropenem is active and azithromycin.





__________________________________________

Corbin Vergara MD





DD:  12/28/2017 15:59:42

DT:  12/28/2017 16:02:30

Job # [id number]

## 2017-12-29 LAB
ALBUMIN SERPL-MCNC: 4.3 G/DL (ref 3–4.8)
ALBUMIN/GLOB SERPL: 1.2 {RATIO} (ref 1.1–1.8)
ALT SERPL-CCNC: 54 U/L (ref 7–56)
AST SERPL-CCNC: 63 U/L (ref 17–59)
BUN SERPL-MCNC: 19 MG/DL (ref 7–21)
CALCIUM SERPL-MCNC: 8.9 MG/DL (ref 8.4–10.5)
GFR NON-AFRICAN AMERICAN: > 60

## 2017-12-29 RX ADMIN — OXYCODONE HYDROCHLORIDE PRN MG: 10 TABLET ORAL at 17:03

## 2017-12-29 RX ADMIN — METHYLPREDNISOLONE SODIUM SUCCINATE SCH MG: 40 INJECTION, POWDER, FOR SOLUTION INTRAMUSCULAR; INTRAVENOUS at 21:11

## 2017-12-29 RX ADMIN — CLOTRIMAZOLE AND BETAMETHASONE DIPROPIONATE SCH APPFUL: 10; .5 CREAM TOPICAL at 10:46

## 2017-12-29 RX ADMIN — METHYLPREDNISOLONE SODIUM SUCCINATE SCH MG: 40 INJECTION, POWDER, FOR SOLUTION INTRAMUSCULAR; INTRAVENOUS at 10:15

## 2017-12-29 RX ADMIN — Medication SCH UNIT: at 10:08

## 2017-12-29 RX ADMIN — MEROPENEM SCH MLS/HR: 1 INJECTION INTRAVENOUS at 06:05

## 2017-12-29 RX ADMIN — OXYCODONE HYDROCHLORIDE PRN MG: 10 TABLET ORAL at 10:32

## 2017-12-29 RX ADMIN — OXYCODONE HYDROCHLORIDE PRN MG: 10 TABLET ORAL at 23:15

## 2017-12-29 NOTE — PN
DATE:  12/28/2017



SUBJECTIVE:  This patient was seen and evaluated earlier today.



PHYSICAL EXAMINATION:

VITAL SIGNS:  Temperature is 97.8, pulse 62 and blood pressure 123/85.

HEENT:  Atraumatic.  Anicteric.

NECK:  Supple.

HEART:  S1 and S2 heard.

LUNGS:  Bilateral air entry present.

ABDOMEN:  Distended.

EXTREMITIES:  Edema present.



IMPRESSION AND PLAN:  This 57-year-old patient with cirrhosis probably

secondary to alcohol use, decompensated, has status post large volume

paracentesis about 6.5 L of fluid removed.  The patient has been on Lasix iv 

40 mg b.i.d.  The patient is being started also on Aldactone 50 mg daily.



The patient has paracentesis  ascitic fluid has relave  lymphocytosis.  

We will recommend to follow up the electrolytes.  

The patient was strictly advised to avoid alcohol.



Thank you very much for allowing us to participate in the care of the

patient.







__________________________________________

Silviano Bill MD







DD:  12/28/2017 22:15:17

DT:  12/28/2017 23:07:41

Job # [id number]







[email or web address]

## 2017-12-29 NOTE — PN
DATE:  12/29/2017



He is being seen today for a followup consultation.



PRESENTATION:  The patient is a 57-year-old white male, appearing older

than his stated age.  He is seen at his bedside.  He carries a diagnosis of

cirrhosis and abdominal ascites.  He continues to have a large girth.  The

patient was originally seen for followup because he has a history of

psychiatric difficulties.  He did not want to be followed.  He sees Dr. Guerrero in the community and feels he is well managed and wants to continue

with him; however, when seen, the patient was very sedated and in further

exploration of his medication, it turns out that he was on his home dose of

Seroquel, but it was being given at 10 a.m. and causing him to be quite

lethargic during the day, so I decreased the dosage and moved it to

bedtime, so we are following up with him today to see how he tolerated

that.  He greets me by saying "what are you doing back here again" and then

he starts laughing which I reminded him that I had changed his medication

and asked him, how it went, and he said he felt much better, thank you, but

he did not want to be followed any further.  He effectively is improved and

appears to be in good spirits.



MENTAL STATUS EXAM:  The patient is alert and oriented x3.  His eye contact

is good.  His behavior is cooperative.  His speech rate and volume are

within normal limits.  Mood is euthymic.  Affect is full.  Thoughts are

goal directed.  He denies being suicidal or homicidal.  He denies presence

of hallucinations, delusions or paranoia.  His concentration and focus, he

indicates are normal.  His memory both short-term and long-term appears to

be adequate.  Appetite is good and he indicates he is sleeping well at

night.



Current labs include sputum, blood and peritoneal fluid all have no growth

after 48 hours.  Gram stain is still pending on the blood and the

peritoneal fluid.



PHYSICAL EXAMINATION:

CURRENT VITAL SIGNS:  Temperature of 97.8, pulse rate of 74, his blood

pressure is 151/78, respirations are 18 and with an O2 saturation of 95

today.



PLAN:  The patient appears to be stable.  Psychiatrically, he is clear.  He

denies being suicidal or homicidal and appears in no imminent danger of

hurting himself or others.  He is tolerating medication change well, so we

will sign off on the patient.



CURRENT MEDICATIONS:  Zithromax, Lotrisone, Klonopin 0.25 mg one p.o.

b.i.d., Cymbalta 20 mg one at bedtime, folic acid, Lasix, gabapentin 600 mg

one p.o. b.i.d.  He has some home medications, lactic acid, Solu-Medrol,

oxycodone for severe pain as needed and the Seroquel is now 100 mg at

bedtime and that is working out for him.  He additionally is on Aldactone

and vitamin B1.  Please call if there are any further concerns for this

patient, followup is with Dr. Guerrero in the community.







__________________________________________

RENEE Diaz



__________________________________________

Sherman Castillo MD



DD:  12/29/2017 11:57:31

DT:  12/29/2017 12:34:36

Job # [id number]





MTDD

## 2017-12-29 NOTE — PN
DATE OF SERVICE:  12/29/2017



SUBJECTIVE:  The patient is in bed, in no acute distress, nontoxic.  No

fever or chills.  The patient is seen early this morning in room #561, bed

#2.



OBJECTIVE:

VITAL SIGNS:  On exam, temperature is 98, blood pressure is 120/70, and

respiratory rate is 16.

HEENT:  Unremarkable.

NECK:  Supple.

LUNGS:  Decreased breath sounds.

HEART:  Normal S1 and S2.

ABDOMEN:  Soft, nontender.



LABORATORY DATA:  Examination reveals the white count is 5.6, hemoglobin of

14, BUN of 19, and creatinine of 0.9.  Urinalysis is noted and HIV is

negative.  Urine for Legionella antigen is negative.  Blood cultures are

negative.  Peritoneal fluid cultures are negative.  Sputum cultures are

negative.  Dr. Jones's note is reviewed.  Dr. Pérez's consultation is

appreciated.



ASSESSMENT AND PLAN:  This is a 57-year-old male with morbid obesity, body

mass index of 46, spinal stenosis, arthritis, degenerative joint disease,

liver disease, depression, anxiety, cirrhosis of the liver, chronic

obstructive lung disease, peripheral neuropathy, anemia, presented with

sepsis, spontaneous bacterial peritonitis with over 500 white blood cells

in the peritoneal fluid associated with community-acquired pneumonia,

congestive heart failure with acute diastolic congestive heart failure on

top of chronic congestive heart failure, on meropenem and Zithromax, day #4

of meropenem and Zithromax, maybe able to switch to oral antibiotics upon

discharge, may change the meropenem to Augmentin and complete the Zithromax

therapy, currently on Solu-Medrol.  The patient is with pulmonary

hypertension.  Overall prognosis is poor.







__________________________________________

Corbin Vergara MD



DD:  12/29/2017 10:40:35

DT:  12/29/2017 12:33:44

Job # [id number]

## 2017-12-29 NOTE — PN
DATE:  12/29/2017



SUBJECTIVE:  This patient appears to be much more comfortable.



PHYSICAL EXAMINATION

VITAL SIGNS:  Temperature is 97.1, pulse is 74, blood pressure is 117/87.

HEENT:  Atraumatic, anicteric.

NECK:  Supple.

HEART:  S1 and S2 heard.

LUNGS:  Bilateral air entry present, slightly reduced in the base.

ABDOMEN:  Softly distended.

EXTREMITIES:  Edema significantly improved.



LABORATORY DATA:  Chemistry shows sodium 143, potassium 4.3, BUN 19,

creatinine 0.8, AST 63, ALT 64.



IMPRESSION AND PLAN:  This 57-year-old patient with decompensated

cirrhosis, probably secondary to active alcohol use, history of pulmonary

hypertension, chronic obstructive pulmonary disease, morbid obesity,

anxiety, depression, had admitted with ascitis, status post large volume

paracentesis done, has relative lymphocytosis in the ascitic fluid. 

Patient has been on antibiotics, now possible pneumonia, chronic

obstructive pulmonary disease.  The concern is about the ascitic fluid,

neutrophil count is less than 250, but is predominantly lymphocytosis.  We

will discuss also with ID regarding this.  Continue the antibiotics as per

ID.  We will continue to closely follow up his care and suggest further

management based on the clinical course.





__________________________________________

Silviano Bill MD





DD:  12/29/2017 18:58:42

DT:  12/29/2017 20:52:03

Job # [id number]



MTDD

## 2017-12-29 NOTE — PN
DATE:  12/28/2017



REFERRING PHYSICIAN:  Dr. Stein.



SUBJECTIVE:  He is ambulating in the room, still every night has cough and

sputum production.  No nausea.  No vomiting.  No diarrhea.  Decreased

abdominal girth.  Decreased leg swelling, has some erythema.



PHYSICAL EXAMINATION:

GENERAL:  In no acute distress.

VITAL SIGNS:  Temperature 98, heart rate 62, respiratory rate is 22, blood

pressure 123/85, pulse ox 99% on room air.

HEENT:  Moist mucous membranes.  Crowded airway.

NECK:  Supple.  No JVD.

LUNGS:  Has scattered rhonchi.

HEART:  S1 and S2.

ABDOMEN:  Soft, still distended, has ascites.

EXTREMITIES:  Decreased edema, still has some erythema.

NEUROLOGIC:  Awake, alert, follow simple commands.



MEDICATIONS:  Aldactone 50 mg daily, Cymbalta 50 mg daily, folic acid 1 mg

daily, Klonopin is 0.25 mg twice a day, Lasix 40 mg q. 12 hours, Lotrisone

to affected area twice a day, meropenem 1 g IV q. 8 hours, Neurontin 600 mg

twice a day, nasal saline each nostril, oxycodone immediate release 10 mg

q. 6 hours p.r.n., Seroquel 100 mg at bedtime, Solu-Medrol 20 mg q. 12

hours, vitamin D 100 mg daily, Zithromax 500 mg daily.



LABORATORY DATA:  Reviewed.  No new lab is available since yesterday.



IMPRESSION AND PLAN:  Morbid obesity, sleep apnea syndrome, chronic liver

disease, ascites, obstructive pulmonary disease, pulmonary hypertension,

anxiety disorder, depression, history of suicidal ideation in the remote

past, large ascites, lumbar radiculopathy, status post volume paracentesis,

still have cough and shortness of breath.  Continue antibiotics.  Keep head

at 45 degrees.  Add Singulair 10 mg daily, fall precaution, continue

diuretics.  Follow up in the morning.



Thank you and we will follow with you.





__________________________________________

Tor Jones MD





DD:  12/28/2017 22:51:31

DT:  12/28/2017 23:26:44

Job # [id number]

## 2017-12-29 NOTE — CP.PCM.PN
<Ludwig Sanders - Last Filed: 12/29/17 11:31>





Subjective





- Date & Time of Evaluation


Date of Evaluation: 12/29/17


Time of Evaluation: 11:31





- Subjective


Subjective: 





Podiatry Progress Note- Dr. Buckley/





57 y.o male seen and evaluated at bedside for lower extremity edema and 

erythema. Patient is seen resting comfortably in bed, in NAD, and AA0x3.   

Patient denies any acute overnight events. Patient denies n/v/cp/chills/f or d. 

Patient reports SOB which he reports improves. No pedal complaints at this time.








Objective





- Vital Signs/Intake and Output


Vital Signs (last 24 hours): 


 











Temp Pulse Resp BP Pulse Ox


 


 97.8 F   74   18   151/78 H  95 


 


 12/29/17 07:30  12/29/17 07:30  12/29/17 07:30  12/29/17 07:30  12/29/17 07:30








Intake and Output: 


 











 12/29/17 12/29/17





 06:59 18:59


 


Intake Total 1020 


 


Balance 1020 














- Medications


Medications: 


 Current Medications





Azithromycin (Zithromax)  500 mg PO DAILY [facility]


   PRN Reason: Protocol


   Last Admin: 12/29/17 10:07 Dose:  500 mg


Betamethasone/Clotrimazole (Lotrisone)  0 gm TOP BID [facility]


   Last Admin: 12/29/17 10:46 Dose:  1 appful


Clonazepam (Klonopin)  0.25 mg PO BID [facility]


   PRN Reason: Protocol


   Last Admin: 12/29/17 10:08 Dose:  0.25 mg


Duloxetine HCl (Cymbalta)  20 mg PO HS [facility]


   Last Admin: 12/28/17 21:12 Dose:  20 mg


Folic Acid (Folic Acid)  1 mg PO DAILY [facility]


   Last Admin: 12/29/17 10:08 Dose:  1 mg


Furosemide (Lasix)  40 mg IVP Q12H [facility]


   Last Admin: 12/29/17 06:04 Dose:  40 mg


Gabapentin (Neurontin)  600 mg PO BID [facility]


   PRN Reason: Protocol


   Last Admin: 12/29/17 10:07 Dose:  600 mg


Home Med (Home Med)  1 unit IH DAILY [facility]


   Last Admin: 12/29/17 10:46 Dose:  1 unit


Home Med (Home Med)  1 unit IH DAILY [facility]


   Last Admin: 12/29/17 10:10 Dose:  1 unit


Home Med (Home Med)  1 unit IH DAILY [facility]


   Last Admin: 12/29/17 10:09 Dose:  Not Given


Meropenem 1 gm/ Sodium (Chloride)  100 mls @ 100 mls/hr IVPB Q8 [facility]


   Stop: 01/04/18 22:01


Lactic Acid (Lac-Hydrin 12% Lotion (225 G))  0 gm EXT DAILY [facility]


   Last Admin: 12/29/17 10:08 Dose:  1 unit


Methylprednisolone (Solu-Medrol)  20 mg IVP Q12 [facility]


   Last Admin: 12/29/17 10:15 Dose:  20 mg


Oxycodone HCl (Oxycodone Immediate Release Tab)  10 mg PO Q6H PRN


   PRN Reason: Pain, severe (8-10)


   Last Admin: 12/29/17 10:32 Dose:  10 mg


Quetiapine Fumarate (Seroquel)  100 mg PO HS [facility]


   PRN Reason: Protocol


   Last Admin: 12/28/17 21:13 Dose:  100 mg


Sodium Chloride (Ocean Nasal Spray)  0 ml NS DAILY PRN


   PRN Reason: Nasal congestion


   Last Admin: 12/28/17 14:49 Dose:  1 sprays


Spironolactone (Aldactone)  50 mg PO DAILY [facility]


   Last Admin: 12/29/17 10:08 Dose:  50 mg


Thiamine HCl (Vitamin B1 Tab)  100 mg PO DAILY [facility]


   Last Admin: 12/29/17 10:08 Dose:  100 mg











- Labs


Labs: 


 





 12/26/17 06:40 





 12/29/17 06:30 





 











PT  16.9 SECONDS (9.4-12.5)  H  12/26/17  06:40    


 


INR  1.52  (0.93-1.08)  H  12/26/17  06:40    


 


APTT  33.6 Seconds (25.1-36.5)   12/25/17  10:10    














- Constitutional


Appears: Well, Non-toxic, No Acute Distress





- Extremities Exam


Additional comments: 





Vasc: DP and PT 2/4 bilaterally, CFT < 3 seconds to digits, temperature 

gradient WNL, pitting edema noted to the LE


Ortho: tenderness with palpation to the LE, MM is 5/5 in all four compartments: 

dorsiflexion, plantarflexion, inversion, eversion


Neuro: gross and protective sensation diminished


Derm: erythema noted to the entire LE bilaterally, has diminished since 

yesterday, xerosis to the LE noted, no open lesions noted, no active drainage, 

no tunneling, no undermining no probe to bone, no open lesions to the LE





- Neurological Exam


Neurological Exam: Alert, Awake, Oriented x3





- Psychiatric Exam


Psychiatric exam: Normal Affect, Normal Mood





Assessment and Plan





- Assessment and Plan (Free Text)


Assessment: 





57 y.o male wit bilaterally lower extremity edema with redness


Plan: 





Patient examined and evaluated at bedside with attending Dr. Pierre


Charts, labs, vitals reviewed (afebrile, WBC=5.6 on 12/26/17 absent leukocytosis

)


Discussed the plan in detail with attending


Lotrisone cream applied to LE, followed by Dimethicone Skin Protectant


Podiatry will continue to follow while in house





<Steve Pierre - Last Filed: 12/30/17 08:41>





Objective





- Vital Signs/Intake and Output


Vital Signs (last 24 hours): 


 











Temp Pulse Resp BP Pulse Ox


 


 97.8 F   68   17   121/80   97 


 


 12/30/17 08:13  12/30/17 08:13  12/30/17 08:13  12/30/17 08:13  12/30/17 08:13








Intake and Output: 


 











 12/30/17 12/30/17





 06:59 18:59


 


Intake Total 240 


 


Balance 240 














- Medications


Medications: 


 Current Medications





Azithromycin (Zithromax)  500 mg PO DAILY [facility]


   PRN Reason: Protocol


   Last Admin: 12/29/17 10:07 Dose:  500 mg


Betamethasone/Clotrimazole (Lotrisone)  0 gm TOP BID [facility]


   Last Admin: 12/29/17 10:46 Dose:  1 appful


Clonazepam (Klonopin)  0.25 mg PO BID [facility]


   PRN Reason: Protocol


   Last Admin: 12/29/17 17:03 Dose:  0.25 mg


Duloxetine HCl (Cymbalta)  20 mg PO HS [facility]


   Last Admin: 12/29/17 21:10 Dose:  20 mg


Folic Acid (Folic Acid)  1 mg PO DAILY [facility]


   Last Admin: 12/29/17 10:08 Dose:  1 mg


Furosemide (Lasix)  40 mg IVP Q12H [facility]


   Last Admin: 12/30/17 05:41 Dose:  40 mg


Gabapentin (Neurontin)  600 mg PO BID [facility]


   PRN Reason: Protocol


   Last Admin: 12/29/17 17:02 Dose:  600 mg


Home Med (Home Med)  1 unit IH DAILY [facility]


   Last Admin: 12/29/17 10:46 Dose:  1 unit


Home Med (Home Med)  1 unit IH DAILY [facility]


   Last Admin: 12/29/17 10:10 Dose:  1 unit


Home Med (Home Med)  1 unit IH DAILY [facility]


   Last Admin: 12/29/17 10:09 Dose:  Not Given


Meropenem 1 gm/ Sodium (Chloride)  100 mls @ 100 mls/hr IVPB Q8 [facility]


   Stop: 01/04/18 22:01


   Last Admin: 12/30/17 05:41 Dose:  100 mls/hr


Lactic Acid (Lac-Hydrin 12% Lotion (225 G))  0 gm EXT DAILY [facility]


   Last Admin: 12/29/17 10:08 Dose:  1 unit


Methylprednisolone (Solu-Medrol)  20 mg IVP Q12 [facility]


   Last Admin: 12/29/17 21:11 Dose:  20 mg


Oxycodone HCl (Oxycodone Immediate Release Tab)  10 mg PO Q6H PRN


   PRN Reason: Pain, severe (8-10)


   Last Admin: 12/30/17 06:51 Dose:  10 mg


Quetiapine Fumarate (Seroquel)  200 mg PO HS [facility]


   PRN Reason: Protocol


   Last Admin: 12/29/17 21:11 Dose:  200 mg


Sodium Chloride (Ocean Nasal Spray)  0 ml NS DAILY PRN


   PRN Reason: Nasal congestion


   Last Admin: 12/28/17 14:49 Dose:  1 sprays


Spironolactone (Aldactone)  50 mg PO DAILY [facility]


   Last Admin: 12/29/17 10:08 Dose:  50 mg


Thiamine HCl (Vitamin B1 Tab)  100 mg PO DAILY [facility]


   Last Admin: 12/29/17 10:08 Dose:  100 mg











- Labs


Labs: 


 





 12/26/17 06:40 





 12/29/17 06:30 





 











PT  16.9 SECONDS (9.4-12.5)  H  12/26/17  06:40    


 


INR  1.52  (0.93-1.08)  H  12/26/17  06:40    


 


APTT  33.6 Seconds (25.1-36.5)   12/25/17  10:10    














Attending/Attestation





- Attestation


I have personally seen and examined this patient.: Yes


I have fully participated in the care of the patient.: Yes


I have reviewed all pertinent clinical information, including history, physical 

exam and plan: Yes

## 2017-12-29 NOTE — CP.PCM.PN
<Adore Sawant - Last Filed: 12/31/17 12:55>





Subjective





- Date & Time of Evaluation


Date of Evaluation: 12/29/17


Time of Evaluation: 18:00





- Subjective


Subjective: 


68 yr  male w/ history of periperhal edema, asthma, COPD, peripheral 

neuropathy, anemia, cirrhosis of liver, multiple leg wound, DJD, arthritis, 

back pain, herniated disc, morbid obesity, spinal stenosis, unsteady gait, 

swollen testicles, anxiety, depression, & ETOH abuse. Pt is sitting up in chair 

at bedside. He denies any stomach discomfort at this time. He says, "I have a 

hard time sleeping because I always took 200mg of seroquel. Please put back my 

home medication dose." Denies any headaches, SOB, N/V, fevers, constipation, 

diarrhea, urinary changes or distress.








Objective





- Vital Signs/Intake and Output


Vital Signs (last 24 hours): 


 











Temp Pulse Resp BP Pulse Ox


 


 97.1 F L  74   18   117/87   95 


 


 12/29/17 16:13  12/29/17 16:13  12/29/17 16:13  12/29/17 17:04  12/29/17 16:13








Intake and Output: 


 











 12/29/17 12/30/17





 18:59 06:59


 


Intake Total 940 


 


Balance 940 














- Medications


Medications: 


 Current Medications





Azithromycin (Zithromax)  500 mg PO DAILY [facility]


   PRN Reason: Protocol


   Last Admin: 12/29/17 10:07 Dose:  500 mg


Betamethasone/Clotrimazole (Lotrisone)  0 gm TOP BID [facility]


   Last Admin: 12/29/17 10:46 Dose:  1 appful


Clonazepam (Klonopin)  0.25 mg PO BID [facility]


   PRN Reason: Protocol


   Last Admin: 12/29/17 17:03 Dose:  0.25 mg


Duloxetine HCl (Cymbalta)  20 mg PO HS [facility]


   Last Admin: 12/29/17 21:10 Dose:  20 mg


Folic Acid (Folic Acid)  1 mg PO DAILY [facility]


   Last Admin: 12/29/17 10:08 Dose:  1 mg


Furosemide (Lasix)  40 mg IVP Q12H [facility]


   Last Admin: 12/29/17 17:04 Dose:  40 mg


Gabapentin (Neurontin)  600 mg PO BID [facility]


   PRN Reason: Protocol


   Last Admin: 12/29/17 17:02 Dose:  600 mg


Home Med (Home Med)  1 unit IH DAILY [facility]


   Last Admin: 12/29/17 10:46 Dose:  1 unit


Home Med (Home Med)  1 unit IH DAILY [facility]


   Last Admin: 12/29/17 10:10 Dose:  1 unit


Home Med (Home Med)  1 unit IH DAILY [facility]


   Last Admin: 12/29/17 10:09 Dose:  Not Given


Meropenem 1 gm/ Sodium (Chloride)  100 mls @ 100 mls/hr IVPB Q8 [facility]


   Stop: 01/04/18 22:01


   Last Admin: 12/29/17 21:10 Dose:  100 mls/hr


Lactic Acid (Lac-Hydrin 12% Lotion (225 G))  0 gm EXT DAILY [facility]


   Last Admin: 12/29/17 10:08 Dose:  1 unit


Methylprednisolone (Solu-Medrol)  20 mg IVP Q12 [facility]


   Last Admin: 12/29/17 21:11 Dose:  20 mg


Oxycodone HCl (Oxycodone Immediate Release Tab)  10 mg PO Q6H PRN


   PRN Reason: Pain, severe (8-10)


   Last Admin: 12/29/17 17:03 Dose:  10 mg


Quetiapine Fumarate (Seroquel)  200 mg PO HS [facility]


   PRN Reason: Protocol


   Last Admin: 12/29/17 21:11 Dose:  200 mg


Sodium Chloride (Ocean Nasal Spray)  0 ml NS DAILY PRN


   PRN Reason: Nasal congestion


   Last Admin: 12/28/17 14:49 Dose:  1 sprays


Spironolactone (Aldactone)  50 mg PO DAILY [facility]


   Last Admin: 12/29/17 10:08 Dose:  50 mg


Thiamine HCl (Vitamin B1 Tab)  100 mg PO DAILY [facility]


   Last Admin: 12/29/17 10:08 Dose:  100 mg











- Labs


Labs: 


 





 12/26/17 06:40 





 12/29/17 06:30 





 











PT  16.9 SECONDS (9.4-12.5)  H  12/26/17  06:40    


 


INR  1.52  (0.93-1.08)  H  12/26/17  06:40    


 


APTT  33.6 Seconds (25.1-36.5)   12/25/17  10:10    














- Constitutional


Appears: No Acute Distress, Chronically Ill





- Head Exam


Head Exam: ATRAUMATIC, NORMAL INSPECTION, NORMOCEPHALIC


Additional comments: 





face appears reddened. fragile skin noted.





- Eye Exam


Eye Exam: EOMI, Normal appearance, PERRL





- ENT Exam


ENT Exam: Mucous Membranes Moist, Normal Exam





- Respiratory Exam


Respiratory Exam: Clear to Ausculation Bilateral, NORMAL BREATHING PATTERN





- Cardiovascular Exam


Cardiovascular Exam: REGULAR RHYTHM, +S1, +S2.  absent: Murmur





- GI/Abdominal Exam


Additional comments: 





obese, excessive adipose tissue in stomach, large panniculus 





- Extremities Exam


Extremities Exam: Full ROM, Normal Capillary Refill





- Back Exam


Back Exam: NORMAL INSPECTION





- Neurological Exam


Neurological Exam: Alert, Awake, CN II-XII Intact, Normal Gait, Oriented x3





- Psychiatric Exam


Psychiatric exam: Normal Affect, Normal Mood





- Skin


Skin Exam: Dry, Erythema, Intact, Warm





Assessment and Plan


(1) Hyperglycemia


Status: Acute   





(2) Elevated LFTs


Status: Acute   





(3) Pneumonia


Status: Acute   





(4) Ascites due to alcoholic cirrhosis


Status: Acute   





(5) Shortness of breath


Status: Acute   





- Assessment and Plan (Free Text)


Assessment: 





IV meropenem/azithromycin. IV steroids. Bipap. Paracentesis done (650ml removed)

. BLE discoloration/xerosis tx w. lotrisone & dimethicone. ETOH/Depression 

stable, increased seroquel to 200mg per patient home med reconciliation. 





Consults:


Surgery - Dr. CATHERINE Gallegos = s/p paracentesis  


GI - Dr. Bill = asictic fluid, neutrophil count less than 250ml, 

predominantly lymphocytosis 


Pulmonary -  = IV abx azithromycin. IV and inhaled bronchodilators. 

elevate lower extremities. smoking cessation, fall precautions, added thiamine. 


I.D. - Dr. Vergara = spontaneous bacterial peritonitis w over 500 WBC w/ CAP

, IV meropenem and zithromax may switch to oral antibiotics (agumentin) upon D/

C home, EF 56%


Podiatry - Dr. Buckley/Dr. Pierre = lotrisone cream LE & dimethicone skin 

protectant 


Psych - Dr. Marcelino/TREVON Pittman = f/u w/ [facility] in community


Neuro - Dr. Pérez = Gabapentin, cymbalta, P.T., ANJU of hamstring/thigh w/ 

stretches (R thigh paresthesia & pain s/t R meralgia paresthetica affecting 

lateral femoral cutaneous nerve of the thigh)  





Reviewed:


Cytology = Ascitic fluid. negative malignant cells. meothelial cells and blood. 


Paracentesis Ultrasound = 6.5 liters of straw colored fluid was removed through 

ultrasound guided large volume paracentesis. 


CXR = RLL infiltrate, atelectasis


Bilateral leg doppler = (-) DVT 


US abd = hepatosplenomegaly w/o focal abnormality, intra-abdominal ascites


ECG = ABNORMAL SR w. PAC, R superior axis deviation, pulmonary disease pattern, 

nonspecific T wave abn





<Estela Stein - Last Filed: 01/01/18 18:03>





Objective





- Vital Signs/Intake and Output


Vital Signs (last 24 hours): 


 











Temp Pulse Resp BP Pulse Ox


 


 98.8 F   69   20   138/90   95 


 


 01/01/18 16:26  01/01/18 16:26  01/01/18 16:26  01/01/18 16:26  01/01/18 16:26








Intake and Output: 


 











 01/01/18 01/01/18





 06:59 18:59


 


Intake Total 1260 


 


Output Total 800 


 


Balance 460 














- Medications


Medications: 


 Current Medications





Amoxicillin/Clavulanate Potassium (Augmentin 875 Mg-125 Mg Tab)  1 tab PO Q12 

[facility]


   PRN Reason: Protocol


   Stop: 01/06/18 22:01


   Last Admin: 01/01/18 09:35 Dose:  1 tab


Azithromycin (Zithromax)  500 mg PO DAILY [facility]


   PRN Reason: Protocol


   Last Admin: 01/01/18 09:37 Dose:  500 mg


Betamethasone/Clotrimazole (Lotrisone)  0 gm TOP BID [facility]


   Last Admin: 01/01/18 09:32 Dose:  1 appful


Clonazepam (Klonopin)  0.25 mg PO BID [facility]


   PRN Reason: Protocol


   Last Admin: 01/01/18 09:34 Dose:  0.25 mg


Duloxetine HCl (Cymbalta)  20 mg PO HS [facility]


   Last Admin: 12/31/17 22:04 Dose:  20 mg


Folic Acid (Folic Acid)  1 mg PO DAILY [facility]


   Last Admin: 01/01/18 09:37 Dose:  1 mg


Furosemide (Lasix)  40 mg IVP Q12H [facility]


   Last Admin: 01/01/18 06:36 Dose:  40 mg


Gabapentin (Neurontin)  600 mg PO BID [facility]


   PRN Reason: Protocol


   Last Admin: 01/01/18 09:37 Dose:  600 mg


Home Med (Home Med)  1 unit IH DAILY [facility]


   Last Admin: 12/31/17 10:59 Dose:  Not Given


Home Med (Home Med)  1 unit IH DAILY [facility]


   Last Admin: 01/01/18 09:33 Dose:  1 unit


Home Med (Home Med)  1 unit IH DAILY [facility]


   Last Admin: 01/01/18 09:33 Dose:  1 unit


Lactic Acid (Lac-Hydrin 12% Lotion (225 G))  0 gm EXT DAILY [facility]


   Last Admin: 01/01/18 09:31 Dose:  1 unit


Methylprednisolone (Solu-Medrol)  20 mg IVP Q12 [facility]


   Last Admin: 01/01/18 09:38 Dose:  20 mg


Nicotine (Nicoderm Cq)  1 patch TD DAILY [facility]


   Last Admin: 01/01/18 09:37 Dose:  1 patch


Oxycodone HCl (Oxycodone Immediate Release Tab)  10 mg PO Q6H PRN


   PRN Reason: Pain, severe (8-10)


   Last Admin: 01/01/18 12:36 Dose:  10 mg


Quetiapine Fumarate (Seroquel)  200 mg PO [facility]


   PRN Reason: Protocol


   Last Admin: 12/31/17 22:03 Dose:  200 mg


Sodium Chloride (Ocean Nasal Spray)  0 ml NS DAILY PRN


   PRN Reason: Nasal congestion


   Last Admin: 12/28/17 14:49 Dose:  1 sprays


Spironolactone (Aldactone)  100 mg PO DAILY [facility]


Thiamine HCl (Vitamin B1 Tab)  100 mg PO DAILY [facility]


   Last Admin: 01/01/18 09:37 Dose:  100 mg











- Labs


Labs: 


 





 12/31/17 07:00 





 12/31/17 07:00 





 











PT  16.9 SECONDS (9.4-12.5)  H  12/26/17  06:40    


 


INR  1.52  (0.93-1.08)  H  12/26/17  06:40    


 


APTT  33.6 Seconds (25.1-36.5)   12/25/17  10:10    














Assessment and Plan





- Assessment and Plan (Free Text)


Assessment: 





pt is seen and examined at bed side , looking comfortable , h/o ethnol , liver 

disease , h/o paracentesis, copd . ch . back pain . getting pain meds . agreed 

all above . cont. present treatment . will f/u

## 2017-12-29 NOTE — CP.PCM.PN
Subjective





- Date & Time of Evaluation


Date of Evaluation: 12/29/17


Time of Evaluation: 10:55





- Subjective


Subjective: 





NEUROLOGY CONSULTATION





CHIEF COMPLAINT:  Right thigh burning pain and numbness and tingling.





SUBJECTIVE:


 Currently, he is walking around


without any difficulty.  He is on gabapentin 600 mg p.o. b.i.d. and


Klonopin for anxiety.





PAST MEDICAL HISTORY:  History of sleep apnea, cirrhotic liver with


ascites, obstructive pulmonary disease, pulmonary hypertension, anxiety,


depression, bilateral chronic lower extremity edema.





SOCIAL HISTORY:  No illicit drug use, smoking, or EtOH use at this time.





ALLERGIES:  NO KNOWN DRUG ALLERGIES.





MEDICATIONS:  Reviewed by nurse's reconciliation sheet.





REVIEW OF SYSTEMS:  A 14-point review of systems is negative except as in


the HPI.





PHYSICAL EXAMINATION


GENERAL:  The patient is sitting up in bed, in no acute distress.


VITAL SIGNS:  REVIEWED. 


HEENT:  Head is atraumatic and normocephalic.  PERRLA.  Extraocular muscles


intact.


NECK:  Supple.  No JVD.  No adenopathy noted.


LUNGS:  Decreased breath sounds bilaterally.


ABDOMEN:  Soft, nontender and nondistended.  Bowel sounds are present.


EXTREMITIES:  Trace edema in the lower extremities and stasis dermatitis in


the lower extremities as well.


NEUROLOGIC:  The patient is alert and oriented to time, person, place,


month and year.  Speech is fluent without any errors.  Cranial nerves II


through XII are intact.  Poor attention span and slow thought process. 


Flat affect.  Motor exam:  Moves all extremities equally.  No pronator


drift seen.  Sensory exam:  Decreased light touch, pinprick, and


proprioception up to the calves bilaterally.  Decreased vibration of the


toes and knees.  DTRs are 2+ throughout, 1 at both knees and absent at the


ankles.  Coordination:  Finger-to-nose intact.  Gait is slightly wide


based.





LABORATORY DATA:  REVIEWED. 





ASSESSMENT AND PLAN:  This is a 57-year-old man with past medical history


of morbid obesity, pneumonia, ascites, sleep apnea syndrome, cirrhotic


liver, obstructive pulmonary disease, pulmonary hypertension, anxiety,


depression, and history of suicidal ideation, who came in for shortness of


breath and bilateral worsening lower extremity edema, which is being


evaluated, was consulted for right thigh burning pain with some paresthesia


in the right thigh area, and he mentioned that it happened after he did


extreme stretching over a week ago.  At this time, his right thigh


paresthesia and pain is likely secondary to right meralgia paresthetica


which is affecting the lateral femoral cutaneous nerve of the thigh.





At this time, recommend:


1.  Continue with gabapentin 600 mg p.o. b.i.d. and Cymbalta 20


mg p.o. at bedtime for neuropathic relief.


2.  Will need physical therapy, likely subacute rehab in terms of hamstring


stretching as well as thigh exercises to relieve pressure over the lateral


femoral cutaneous nerve and therapeutic exercises.


3.  Continue with management for underlying COPD and bilateral chronic


lower extremity edema.


4.  Continue to monitor electrolytes and correct accordingly.





THANK YOU 











__________________________________________


Jeremy Pérez MD








Objective





- Vital Signs/Intake and Output


Vital Signs (last 24 hours): 


 











Temp Pulse Resp BP Pulse Ox


 


 97.8 F   74   18   151/78 H  95 


 


 12/29/17 07:30  12/29/17 07:30  12/29/17 07:30  12/29/17 07:30  12/29/17 07:30








Intake and Output: 


 











 12/29/17 12/29/17





 06:59 18:59


 


Intake Total 1020 840


 


Balance 1020 840














- Medications


Medications: 


 Current Medications





Azithromycin (Zithromax)  500 mg PO DAILY [facility]


   PRN Reason: Protocol


   Last Admin: 12/29/17 10:07 Dose:  500 mg


Betamethasone/Clotrimazole (Lotrisone)  0 gm TOP BID [facility]


   Last Admin: 12/29/17 10:46 Dose:  1 appful


Clonazepam (Klonopin)  0.25 mg PO BID [facility]


   PRN Reason: Protocol


   Last Admin: 12/29/17 10:08 Dose:  0.25 mg


Duloxetine HCl (Cymbalta)  20 mg PO HS [facility]


   Last Admin: 12/28/17 21:12 Dose:  20 mg


Folic Acid (Folic Acid)  1 mg PO DAILY [facility]


   Last Admin: 12/29/17 10:08 Dose:  1 mg


Furosemide (Lasix)  40 mg IVP Q12H [facility]


   Last Admin: 12/29/17 06:04 Dose:  40 mg


Gabapentin (Neurontin)  600 mg PO BID [facility]


   PRN Reason: Protocol


   Last Admin: 12/29/17 10:07 Dose:  600 mg


Home Med (Home Med)  1 unit IH DAILY [facility]


   Last Admin: 12/29/17 10:46 Dose:  1 unit


Home Med (Home Med)  1 unit IH DAILY [facility]


   Last Admin: 12/29/17 10:10 Dose:  1 unit


Home Med (Home Med)  1 unit IH DAILY [facility]


   Last Admin: 12/29/17 10:09 Dose:  Not Given


Meropenem 1 gm/ Sodium (Chloride)  100 mls @ 100 mls/hr IVPB Q8 [facility]


   Stop: 01/04/18 22:01


   Last Admin: 12/29/17 14:27 Dose:  100 mls/hr


Lactic Acid (Lac-Hydrin 12% Lotion (225 G))  0 gm EXT DAILY [facility]


   Last Admin: 12/29/17 10:08 Dose:  1 unit


Methylprednisolone (Solu-Medrol)  20 mg IVP Q12 [facility]


   Last Admin: 12/29/17 10:15 Dose:  20 mg


Oxycodone HCl (Oxycodone Immediate Release Tab)  10 mg PO Q6H PRN


   PRN Reason: Pain, severe (8-10)


   Last Admin: 12/29/17 10:32 Dose:  10 mg


Quetiapine Fumarate (Seroquel)  100 mg PO HS [facility]


   PRN Reason: Protocol


   Last Admin: 12/28/17 21:13 Dose:  100 mg


Sodium Chloride (Ocean Nasal Spray)  0 ml NS DAILY [facility]


   PRN Reason: Nasal congestion


   Last Admin: 12/28/17 14:49 Dose:  1 sprays


Spironolactone (Aldactone)  50 mg PO DAILY [facility]


   Last Admin: 12/29/17 10:08 Dose:  50 mg


Thiamine HCl (Vitamin B1 Tab)  100 mg PO DAILY [facility]


   Last Admin: 12/29/17 10:08 Dose:  100 mg











- Labs


Labs: 


 





 12/26/17 06:40 





 12/29/17 06:30 





 











PT  16.9 SECONDS (9.4-12.5)  H  12/26/17  06:40    


 


INR  1.52  (0.93-1.08)  H  12/26/17  06:40    


 


APTT  33.6 Seconds (25.1-36.5)   12/25/17  10:10

## 2017-12-30 RX ADMIN — CLOTRIMAZOLE AND BETAMETHASONE DIPROPIONATE SCH APPFUL: 10; .5 CREAM TOPICAL at 17:34

## 2017-12-30 RX ADMIN — AMOXICILLIN AND CLAVULANATE POTASSIUM SCH TAB: 875; 125 TABLET, FILM COATED ORAL at 23:00

## 2017-12-30 RX ADMIN — OXYCODONE HYDROCHLORIDE PRN MG: 10 TABLET ORAL at 18:51

## 2017-12-30 RX ADMIN — OXYCODONE HYDROCHLORIDE PRN MG: 10 TABLET ORAL at 12:59

## 2017-12-30 RX ADMIN — CLOTRIMAZOLE AND BETAMETHASONE DIPROPIONATE SCH APPFUL: 10; .5 CREAM TOPICAL at 10:19

## 2017-12-30 RX ADMIN — METHYLPREDNISOLONE SODIUM SUCCINATE SCH MG: 40 INJECTION, POWDER, FOR SOLUTION INTRAMUSCULAR; INTRAVENOUS at 21:07

## 2017-12-30 RX ADMIN — CLOTRIMAZOLE AND BETAMETHASONE DIPROPIONATE SCH APPFUL: 10; .5 CREAM TOPICAL at 17:32

## 2017-12-30 RX ADMIN — OXYCODONE HYDROCHLORIDE PRN MG: 10 TABLET ORAL at 06:51

## 2017-12-30 RX ADMIN — Medication SCH UNIT: at 10:19

## 2017-12-30 RX ADMIN — METHYLPREDNISOLONE SODIUM SUCCINATE SCH MG: 40 INJECTION, POWDER, FOR SOLUTION INTRAMUSCULAR; INTRAVENOUS at 10:14

## 2017-12-30 NOTE — PN
DATE:  12/30/2017



PULMONARY PROGRESS NOTE



REFERRING PHYSICIAN:  Dr. Stein.



SUBJECTIVE:  He is out of bed to chair.  Feels better.  Decreased cough,

decreased shortness of breath.  No nausea or vomiting.  No abdominal pain. 

Decreased leg swelling.



OBJECTIVE:

GENERAL:  In no acute distress.

VITAL SIGNS:  Temperature is 98, heart rate is 67, respiratory rate is 20,

blood pressure is 139/69 and pulse oximetry 93% on 2 liters nasal cannula.

HEENT:  Moist mucous membranes.  No ulcer or thrush noted.

NECK:  Supple.  No JVD.

LUNGS:  Has a scattered rhonchi.

HEART:  S1 and S2.

ABDOMEN:  Soft and nontender.  Distended.

EXTREMITIES:  There is no edema.

NEUROLOGIC:  Awake, alert and follows simple commands.



MEDICATIONS:  He is on Aldactone 50 mg daily, Cymbalta 20 mg at bedtime,

folic acid 1 mg daily, also on clonazepam 0.25 mg twice a day, Lasix 40 mg

twice a day, Lotrisone twice a day, meropenem 1 g IV q. 8 hours, Neurontin

600 mg twice a day, oxycodone 10 mg q. 6 hours p.r.n., Seroquel 200 mg at

bedtime, Solu-Medrol 20 mg q. 12 hours, vitamin B1 100 mg daily, Zithromax

500 mg daily.



LABORATORY DATA:  Reviewed and noted no new lab is available since

yesterday.



IMPRESSION AND PLAN:  Morbid obesity, sleep apnea syndrome, chronic lung

disease, ascites, obstructive pulmonary disease, pulmonary hypertension,

anxiety disorder, depression, history of suicidal ideation in the past,

largest ascites status post volume paracentesis, lumbar radiculopathy. 

Pulmonary point of view, doing okay.  Continue IV and inhaled

bronchodilator, antibiotics, diuretics, gastric prophylaxis, DVT

prophylaxis, fall precautions, encourage BiPAP use while sleeping.  Thank

you and we will follow with you.



__________________________________________

Tor Jones MD



DD:  12/30/2017 18:48:48

DT:  12/30/2017 19:15:06

Job # [id number]

## 2017-12-30 NOTE — PN
DATE:  12/30/2017



SUBJECTIVE:  Patient is in bed, in no acute distress, nontoxic.



PHYSICAL EXAMINATION

VITAL SIGNS:  Temperature is 98, blood pressure is 120/70, respiratory rate

of 16.

HEENT:  Unremarkable.

NECK:  Supple.

LUNGS:  Have decreased breath sounds.

HEART:  Normal S1, S2.

ABDOMEN:  Soft, nontender.



LABORATORY EXAMINATION:  Reveals the patient's white count to be 5.6,

hemoglobin of 14, platelets 162.  Chemistries reveals BUN of 19, creatinine

of 0.8.  Urinalysis is noted.  WBC is 509 in ascitic fluid.  Urine

Legionella is negative.  HIV is negative.  Microbiology reveals cultures

are negative including sputum culture, blood cultures, and peritoneal fluid

culture.  Dr. Staples's note is reviewed from today.



IMPRESSION AND PLAN:  This 57-year-old male with morbid obesity, BMI of 46,

spinal stenosis, arthritis, degenerative joint disease, liver disease,

depression, anxiety, cirrhosis of liver, chronic obstructive lung disease,

peripheral neuropathy, anemia, presented with sepsis, spontaneous bacterial

peritonitis, peritoneal fluid over 500, and completed meropenem therapy. 

Currently on Cymbalta, p.o. Zithromax, we will change the meropenem to p.o.

Augmentin and complete p.o. Zithromax both for 5 to 7 days.





__________________________________________

Corbin Vergara MD





DD:  12/30/2017 21:32:49

DT:  12/30/2017 21:56:40

Job # [id number]

## 2017-12-30 NOTE — CP.PCM.PN
<Ludwig Sanders - Last Filed: 12/30/17 09:55>





Subjective





- Date & Time of Evaluation


Date of Evaluation: 12/30/17


Time of Evaluation: 09:55





- Subjective


Subjective: 





Podiatry Progress Note- Dr. Buckley/





57 y.o male seen and evaluated at bedside for lower extremity edema and 

erythema. Patient is seen resting comfortably at bedside in NAD, and AA0x3.   

Patient denies any acute overnight events. Patient was not wearing compression 

stockings at the time of visitation. Patient reports that he is feeling better 

and has been ambulating around more the hospital. Patient denies n/v/cp/chills/

f or d. Patient reports his breathing has improved. No other pedal complaints 

at this time.





Objective





- Vital Signs/Intake and Output


Vital Signs (last 24 hours): 


 











Temp Pulse Resp BP Pulse Ox


 


 97.8 F   68   17   121/80   97 


 


 12/30/17 08:13  12/30/17 08:13  12/30/17 08:13  12/30/17 08:13  12/30/17 08:13








Intake and Output: 


 











 12/30/17 12/30/17





 06:59 18:59


 


Intake Total 240 


 


Balance 240 














- Medications


Medications: 


 Current Medications





Azithromycin (Zithromax)  500 mg PO DAILY [facility]


   PRN Reason: Protocol


   Last Admin: 12/29/17 10:07 Dose:  500 mg


Betamethasone/Clotrimazole (Lotrisone)  0 gm TOP BID [facility]


   Last Admin: 12/29/17 10:46 Dose:  1 appful


Clonazepam (Klonopin)  0.25 mg PO BID [facility]


   PRN Reason: Protocol


   Last Admin: 12/29/17 17:03 Dose:  0.25 mg


Duloxetine HCl (Cymbalta)  20 mg PO HS [facility]


   Last Admin: 12/29/17 21:10 Dose:  20 mg


Folic Acid (Folic Acid)  1 mg PO DAILY [facility]


   Last Admin: 12/29/17 10:08 Dose:  1 mg


Furosemide (Lasix)  40 mg IVP Q12H [facility]


   Last Admin: 12/30/17 05:41 Dose:  40 mg


Gabapentin (Neurontin)  600 mg PO BID [facility]


   PRN Reason: Protocol


   Last Admin: 12/29/17 17:02 Dose:  600 mg


Home Med (Home Med)  1 unit IH DAILY [facility]


   Last Admin: 12/29/17 10:46 Dose:  1 unit


Home Med (Home Med)  1 unit IH DAILY [facility]


   Last Admin: 12/29/17 10:10 Dose:  1 unit


Home Med (Home Med)  1 unit IH DAILY [facility]


   Last Admin: 12/29/17 10:09 Dose:  Not Given


Meropenem 1 gm/ Sodium (Chloride)  100 mls @ 100 mls/hr IVPB Q8 [facility]


   Stop: 01/04/18 22:01


   Last Admin: 12/30/17 05:41 Dose:  100 mls/hr


Lactic Acid (Lac-Hydrin 12% Lotion (225 G))  0 gm EXT DAILY [facility]


   Last Admin: 12/29/17 10:08 Dose:  1 unit


Methylprednisolone (Solu-Medrol)  20 mg IVP Q12 [facility]


   Last Admin: 12/29/17 21:11 Dose:  20 mg


Oxycodone HCl (Oxycodone Immediate Release Tab)  10 mg PO Q6H PRN


   PRN Reason: Pain, severe (8-10)


   Last Admin: 12/30/17 06:51 Dose:  10 mg


Quetiapine Fumarate (Seroquel)  200 mg PO HS [facility]


   PRN Reason: Protocol


   Last Admin: 12/29/17 21:11 Dose:  200 mg


Sodium Chloride (Ocean Nasal Spray)  0 ml NS DAILY [facility]N


   PRN Reason: Nasal congestion


   Last Admin: 12/28/17 14:49 Dose:  1 sprays


Spironolactone (Aldactone)  50 mg PO DAILY [facility]


   Last Admin: 12/29/17 10:08 Dose:  50 mg


Thiamine HCl (Vitamin B1 Tab)  100 mg PO DAILY [facility]


   Last Admin: 12/29/17 10:08 Dose:  100 mg











- Labs


Labs: 


 





 12/26/17 06:40 





 12/29/17 06:30 





 











PT  16.9 SECONDS (9.4-12.5)  H  12/26/17  06:40    


 


INR  1.52  (0.93-1.08)  H  12/26/17  06:40    


 


APTT  33.6 Seconds (25.1-36.5)   12/25/17  10:10    














- Constitutional


Appears: Well, Non-toxic, No Acute Distress





- Extremities Exam


Additional comments: 








Vasc: DP and PT 2/4 bilaterally, CFT < 3 seconds to digits, temperature 

gradient WNL, pitting edema noted to the LE


Ortho: tenderness with palpation to the LE, MM is 5/5 in all four compartments: 

dorsiflexion, plantarflexion, inversion, eversion


Neuro: gross and protective sensation diminished


Derm: reduced erythema noted to the entire LE bilaterally, mild xerosis to the 

LE noted- has improved significantly, no open lesions noted, no active drainage

, no tunneling, no undermining no probe to bone, no open lesions to the LE








- Neurological Exam


Neurological Exam: Alert, Awake, Oriented x3





- Psychiatric Exam


Psychiatric exam: Normal Affect, Normal Mood





Assessment and Plan





- Assessment and Plan (Free Text)


Assessment: 





57 y.o male wit bilaterally lower extremity edema with redness- improving


Plan: 





Patient examined and evaluated at bedside with attending Dr. Pierre


Charts, labs, vitals reviewed (afebrile, WBC=5.6 on 12/26/17 absent leukocytosis

)


Discussed the plan in detail with attending


Lotrisone cream applied to LE, followed by Lac-Hydrin


Instructed on the importance of wearing compression stockings. 


Reports that patient has to wear compression stockings when ambulating


May take off at night when in bed


Patient is stable per podiatry standpoint


Podiatry will continue to follow while in house





<Steve Pierre - Last Filed: 01/02/18 10:52>





Objective





- Vital Signs/Intake and Output


Vital Signs (last 24 hours): 


 











Temp Pulse Resp BP Pulse Ox


 


 97.7 F   52 L  20   102/64   96 


 


 01/02/18 07:00  01/02/18 07:00  01/02/18 07:00  01/02/18 07:00  01/02/18 07:00








Intake and Output: 


 











 01/02/18 01/02/18





 06:59 18:59


 


Intake Total 420 


 


Balance 420 














- Medications


Medications: 


 Current Medications





Albuterol Sulfate (Albuterol 0.042% Inhal Sol (1.25mg/3ml) Ud)  1.25 mg IH 

[id number] [facility]


   Stop: 01/02/18 20:00


   Last Admin: 01/02/18 07:28 Dose:  1.25 mg


Amoxicillin/Clavulanate Potassium (Augmentin 875 Mg-125 Mg Tab)  1 tab PO Q12 

[facility]


   PRN Reason: Protocol


   Stop: 01/06/18 22:01


   Last Admin: 01/02/18 10:14 Dose:  1 tab


Azithromycin (Zithromax)  500 mg PO DAILY [facility]


   PRN Reason: Protocol


   Last Admin: 01/02/18 10:14 Dose:  500 mg


Betamethasone/Clotrimazole (Lotrisone)  0 gm TOP BID [facility]


   Last Admin: 01/02/18 10:14 Dose:  1 appful


Clonazepam (Klonopin)  0.25 mg PO BID [facility]


   PRN Reason: Protocol


   Last Admin: 01/02/18 10:14 Dose:  0.25 mg


Duloxetine HCl (Cymbalta)  20 mg PO HS [facility]


   Last Admin: 01/01/18 22:02 Dose:  20 mg


Folic Acid (Folic Acid)  1 mg PO DAILY [facility]


   Last Admin: 01/02/18 10:16 Dose:  1 mg


Furosemide (Lasix)  40 mg IVP Q12H [facility]


   Last Admin: 01/02/18 05:30 Dose:  40 mg


Gabapentin (Neurontin)  600 mg PO BID [facility]


   PRN Reason: Protocol


   Last Admin: 01/02/18 10:16 Dose:  600 mg


Home Med (Home Med)  1 unit IH DAILY [facility]


   Last Admin: 01/02/18 10:18 Dose:  Not Given


Home Med (Home Med)  1 unit IH DAILY [facility]


   Last Admin: 01/02/18 10:17 Dose:  1 unit


Home Med (Home Med)  1 unit IH DAILY [facility]


   Last Admin: 01/02/18 10:17 Dose:  1 unit


Lactic Acid (Lac-Hydrin 12% Lotion (225 G))  0 gm EXT DAILY [facility]


   Last Admin: 01/02/18 10:17 Dose:  1 unit


Methylprednisolone (Solu-Medrol)  20 mg IVP Q12 [facility]


   Last Admin: 01/02/18 10:13 Dose:  20 mg


Nicotine (Nicoderm Cq)  1 patch TD DAILY [facility]


   Last Admin: 01/02/18 10:18 Dose:  1 patch


Oxycodone HCl (Oxycodone Immediate Release Tab)  10 mg PO Q6H PRN


   PRN Reason: Pain, severe (8-10)


   Last Admin: 01/02/18 05:33 Dose:  10 mg


Quetiapine Fumarate (Seroquel)  200 mg PO HS [facility]


   PRN Reason: Protocol


   Last Admin: 01/01/18 22:02 Dose:  200 mg


Sodium Chloride (Ocean Nasal Spray)  0 ml NS DAILY [facility]


   PRN Reason: Nasal congestion


   Last Admin: 12/28/17 14:49 Dose:  1 sprays


Spironolactone (Aldactone)  100 mg PO DAILY [facility]


   Last Admin: 01/02/18 10:14 Dose:  100 mg


Thiamine HCl (Vitamin B1 Tab)  100 mg PO DAILY [facility]


   Last Admin: 01/02/18 10:14 Dose:  100 mg











- Labs


Labs: 


 





 01/02/18 06:20 





 01/02/18 06:20 





 











PT  16.9 SECONDS (9.4-12.5)  H  12/26/17  06:40    


 


INR  1.52  (0.93-1.08)  H  12/26/17  06:40    


 


APTT  33.6 Seconds (25.1-36.5)   12/25/17  10:10    














Attending/Attestation





- Attestation


I have personally seen and examined this patient.: Yes


I have fully participated in the care of the patient.: Yes


I have reviewed all pertinent clinical information, including history, physical 

exam and plan: Yes

## 2017-12-30 NOTE — PN
DATE:  12/29/2017



PULMONARY PROGRESS NOTE



REFERRING PHYSICIAN:  Dr. Stein.



SUBJECTIVE:  He is ambulating in the hallway, feels better, still every

night has cough.  No shortness of breath.  No chest pain.  No nausea. 

Again recurrent ascites.  Decreased leg swelling.



OBJECTIVE:

GENERAL:  In no acute distress.

VITAL SIGNS:  Temperature 98, heart rate 74, respiratory rate 18, blood

pressure 117/87, pulse ox 95% on room air.

HEENT:  Moist mucous membranes.  Crowded airway.

NECK:  Supple.  No JVD.  Nasal mucosa moist, erythematous.

LUNGS:  Has a fair airflow with rhonchi.

HEART:  S1 and S2.

ABDOMEN:  Distended, has ascites.

EXTREMITIES:  Decreased edema.

NEUROLOGIC:  Awake, alert, follow simple commands.



MEDICATIONS:  Aldactone 50 mg daily, Cymbalta 20 mg at bedtime, folic acid

1 mg daily, Klonopin 0.25 mg twice a day, Lasix 40 mg IV twice a day,

Lotrisone to affected area twice a day, meropenem 1 g IV q. 8 hours,

Neurontin 600 mg twice a day, oxycodone immediate release 10 mg q. 6 hours

p.r.n., Seroquel 200 mg at bedtime, Solu-Medrol 20 mg q. 12 hours, vitamin

B1 100 mg daily, Zithromax 500 mg daily.



LABORATORY DATA:  Reviewed, noted sodium 147, potassium 4.3, chloride 100,

bicarbonate 33, BUN 19, creatinine 0.8, glucose 106, calcium 8.9, total

bilirubin 1.0, AST 63, ALT 54, alk phos 89, albumin 4.3.  Microbiology: 

Blood culture and sputum culture, there is no growth.



IMPRESSION AND PLAN:  Morbid obesity, sleep apnea syndrome, chronic liver

disease, ascites, obstructive pulmonary disease, pulmonary hypertension,

anxiety disorder, depression, history of suicidal ideation in the past,

large ascites, status post volume _____, lumbar radiculopathy.  From

pulmonary point of view, doing okay.  Continue IV and inhaled

bronchodilator.  Continue antibiotics.  Continue diuretics, _____ elevate

lower extremity.



Thank you and we will follow with you.





__________________________________________

Tor Jones MD





DD:  12/29/2017 20:24:44

DT:  12/29/2017 21:11:21

Job # [id number]

## 2017-12-31 LAB
ALBUMIN SERPL-MCNC: 4 G/DL (ref 3–4.8)
ALBUMIN/GLOB SERPL: 1.3 {RATIO} (ref 1.1–1.8)
ALT SERPL-CCNC: 62 U/L (ref 7–56)
AST SERPL-CCNC: 53 U/L (ref 17–59)
BUN SERPL-MCNC: 24 MG/DL (ref 7–21)
CALCIUM SERPL-MCNC: 8.9 MG/DL (ref 8.4–10.5)
ERYTHROCYTE [DISTWIDTH] IN BLOOD BY AUTOMATED COUNT: 14.1 % (ref 11.5–14.5)
GFR NON-AFRICAN AMERICAN: > 60
HGB BLD-MCNC: 14.3 G/DL (ref 14–18)
MCH RBC QN AUTO: 27.4 PG (ref 25–35)
MCHC RBC AUTO-ENTMCNC: 31 G/DL (ref 31–37)
MCV RBC AUTO: 88.5 FL (ref 80–105)
PLATELET # BLD: 132 10^3/UL (ref 120–450)
PMV BLD AUTO: 10.7 FL (ref 7–11)
RBC # BLD AUTO: 5.21 10^6/UL (ref 3.5–6.1)
WBC # BLD AUTO: 6.8 10^3/UL (ref 4.5–11)

## 2017-12-31 RX ADMIN — METHYLPREDNISOLONE SODIUM SUCCINATE SCH MG: 40 INJECTION, POWDER, FOR SOLUTION INTRAMUSCULAR; INTRAVENOUS at 09:11

## 2017-12-31 RX ADMIN — AMOXICILLIN AND CLAVULANATE POTASSIUM SCH TAB: 875; 125 TABLET, FILM COATED ORAL at 09:11

## 2017-12-31 RX ADMIN — AMOXICILLIN AND CLAVULANATE POTASSIUM SCH TAB: 875; 125 TABLET, FILM COATED ORAL at 02:17

## 2017-12-31 RX ADMIN — OXYCODONE HYDROCHLORIDE PRN MG: 10 TABLET ORAL at 13:18

## 2017-12-31 RX ADMIN — AMOXICILLIN AND CLAVULANATE POTASSIUM SCH TAB: 875; 125 TABLET, FILM COATED ORAL at 22:03

## 2017-12-31 RX ADMIN — CLOTRIMAZOLE AND BETAMETHASONE DIPROPIONATE SCH APPFUL: 10; .5 CREAM TOPICAL at 18:04

## 2017-12-31 RX ADMIN — CLOTRIMAZOLE AND BETAMETHASONE DIPROPIONATE SCH: 10; .5 CREAM TOPICAL at 11:01

## 2017-12-31 RX ADMIN — Medication SCH: at 11:01

## 2017-12-31 RX ADMIN — CLOTRIMAZOLE AND BETAMETHASONE DIPROPIONATE SCH APPFUL: 10; .5 CREAM TOPICAL at 18:05

## 2017-12-31 RX ADMIN — OXYCODONE HYDROCHLORIDE PRN MG: 10 TABLET ORAL at 18:43

## 2017-12-31 RX ADMIN — METHYLPREDNISOLONE SODIUM SUCCINATE SCH MG: 40 INJECTION, POWDER, FOR SOLUTION INTRAMUSCULAR; INTRAVENOUS at 22:04

## 2017-12-31 RX ADMIN — Medication SCH UNIT: at 18:53

## 2017-12-31 RX ADMIN — OXYCODONE HYDROCHLORIDE PRN MG: 10 TABLET ORAL at 07:25

## 2017-12-31 NOTE — CP.PCM.PN
<Ludwig Sanders - Last Filed: 12/31/17 12:54>





Subjective





- Date & Time of Evaluation


Date of Evaluation: 12/31/17


Time of Evaluation: 12:54





- Subjective


Subjective: 





Podiatry Progress Note- Dr. Buckley/





57 y.o male seen and evaluated at bedside for lower extremity edema and 

erythema. Patient is seen resting comfortably at bedside in NAD, and AA0x3.   

Patient denies any acute overnight events. Nursing was at bedside putting 

compression stockings on. Patient denies n/v/cp/chills/f or d. Patient reports 

he is feeling much better. He can breathe. No other pedal complaints at this 

time.





Objective





- Vital Signs/Intake and Output


Vital Signs (last 24 hours): 


 











Temp Pulse Resp BP Pulse Ox


 


 98.6 F   69   20   139/90   97 


 


 12/31/17 07:36  12/31/17 07:36  12/31/17 07:36  12/31/17 07:36  12/31/17 07:36








Intake and Output: 


 











 12/31/17 12/31/17





 06:59 18:59


 


Intake Total 1020 


 


Balance 1020 














- Medications


Medications: 


 Current Medications





Amoxicillin/Clavulanate Potassium (Augmentin 875 Mg-125 Mg Tab)  1 tab PO Q12 

[facility]


   PRN Reason: Protocol


   Stop: 01/06/18 22:01


   Last Admin: 12/31/17 09:11 Dose:  1 tab


Azithromycin (Zithromax)  500 mg PO DAILY [facility]


   PRN Reason: Protocol


   Last Admin: 12/31/17 09:13 Dose:  500 mg


Betamethasone/Clotrimazole (Lotrisone)  0 gm TOP BID [facility]


   Last Admin: 12/31/17 11:01 Dose:  Not Given


Clonazepam (Klonopin)  0.25 mg PO BID [facility]


   PRN Reason: Protocol


   Last Admin: 12/31/17 09:12 Dose:  0.25 mg


Duloxetine HCl (Cymbalta)  20 mg PO HS [facility]


   Last Admin: 12/30/17 21:07 Dose:  20 mg


Folic Acid (Folic Acid)  1 mg PO DAILY [facility]


   Last Admin: 12/31/17 09:13 Dose:  1 mg


Furosemide (Lasix)  40 mg IVP Q12H [facility]


   Last Admin: 12/31/17 05:44 Dose:  40 mg


Gabapentin (Neurontin)  600 mg PO BID [facility]


   PRN Reason: Protocol


   Last Admin: 12/31/17 09:12 Dose:  600 mg


Home Med (Home Med)  1 unit IH DAILY [facility]


   Last Admin: 12/31/17 10:59 Dose:  Not Given


Home Med (Home Med)  1 unit IH DAILY [facility]


   Last Admin: 12/31/17 10:59 Dose:  1 unit


Home Med (Home Med)  1 unit IH DAILY [facility]


   Last Admin: 12/31/17 12:23 Dose:  1 unit


Lactic Acid (Lac-Hydrin 12% Lotion (225 G))  0 gm EXT DAILY [facility]


   Last Admin: 12/31/17 11:01 Dose:  Not Given


Methylprednisolone (Solu-Medrol)  20 mg IVP Q12 [facility]


   Last Admin: 12/31/17 09:11 Dose:  20 mg


Oxycodone HCl (Oxycodone Immediate Release Tab)  10 mg PO Q6H [facility]N


   PRN Reason: Pain, severe (8-10)


   Last Admin: 12/31/17 07:25 Dose:  10 mg


Quetiapine Fumarate (Seroquel)  200 mg PO HS [facility]


   PRN Reason: Protocol


   Last Admin: 12/30/17 21:06 Dose:  200 mg


Sodium Chloride (Ocean Nasal Spray)  0 ml NS DAILY [facility]


   PRN Reason: Nasal congestion


   Last Admin: 12/28/17 14:49 Dose:  1 sprays


Spironolactone (Aldactone)  50 mg PO DAILY [facility]


   Last Admin: 12/31/17 09:11 Dose:  50 mg


Thiamine HCl (Vitamin B1 Tab)  100 mg PO DAILY [facility]


   Last Admin: 12/31/17 09:11 Dose:  100 mg











- Labs


Labs: 


 





 12/31/17 07:00 





 12/31/17 07:00 





 











PT  16.9 SECONDS (9.4-12.5)  H  12/26/17  06:40    


 


INR  1.52  (0.93-1.08)  H  12/26/17  06:40    


 


APTT  33.6 Seconds (25.1-36.5)   12/25/17  10:10    














- Constitutional


Appears: Well, Non-toxic, No Acute Distress





- Extremities Exam


Additional comments: 








Vasc: DP and PT 2/4 bilaterally, CFT < 3 seconds to digits, temperature 

gradient WNL, pitting edema noted to the LE


Ortho: tenderness with palpation to the LE, MM is 5/5 in all four compartments: 

dorsiflexion, plantarflexion, inversion, eversion


Neuro: gross and protective sensation diminished


Derm: reduced erythema noted to the entire LE bilaterally, mild xerosis to the 

LE noted- has improved significantly, no open lesions noted, no active drainage

, no tunneling, no undermining no probe to bone, no open lesions to the LE











- Neurological Exam


Neurological Exam: Alert, Oriented x3





- Psychiatric Exam


Psychiatric exam: Normal Affect, Normal Mood





Assessment and Plan





- Assessment and Plan (Free Text)


Assessment: 





57 y.o male wit bilaterally lower extremity edema with redness- improving


Plan: 








Patient examined and evaluated at bedside with attending Dr. Pierre


Charts, labs, vitals reviewed (afebrile, WBC=6.8 on 12/31/17 absent leukocytosis

)


Discussed the plan in detail with attending


Lotrisone cream will be applied to LE, followed by Lac-Hydrin later today by 

nursing


Instructed on the importance of wearing compression stockings. 


Reports that patient has to wear compression stockings when ambulating


May take off at night when in bed


Patient is stable per podiatry standpoint


Podiatry will continue to follow while in house


Upon discharge, patient will followup with Dr. Buckley in office within 1 week


Upon discharge, patient is to apply Lotrison cream and Lac-Hydrin cream. C/w 

with compression. LE Elevation.





<Steve Pierre - Last Filed: 01/02/18 10:53>





Objective





- Vital Signs/Intake and Output


Vital Signs (last 24 hours): 


 











Temp Pulse Resp BP Pulse Ox


 


 97.7 F   52 L  20   102/64   96 


 


 01/02/18 07:00  01/02/18 07:00  01/02/18 07:00  01/02/18 07:00  01/02/18 07:00








Intake and Output: 


 











 01/02/18 01/02/18





 06:59 18:59


 


Intake Total 420 


 


Balance 420 














- Medications


Medications: 


 Current Medications





Albuterol Sulfate (Albuterol 0.042% Inhal Sol (1.25mg/3ml) Ud)  1.25 mg IH 

[id number] [facility]


   Stop: 01/02/18 20:00


   Last Admin: 01/02/18 07:28 Dose:  1.25 mg


Amoxicillin/Clavulanate Potassium (Augmentin 875 Mg-125 Mg Tab)  1 tab PO Q12 

[facility]


   PRN Reason: Protocol


   Stop: 01/06/18 22:01


   Last Admin: 01/02/18 10:14 Dose:  1 tab


Azithromycin (Zithromax)  500 mg PO DAILY [facility]


   PRN Reason: Protocol


   Last Admin: 01/02/18 10:14 Dose:  500 mg


Betamethasone/Clotrimazole (Lotrisone)  0 gm TOP BID [facility]


   Last Admin: 01/02/18 10:14 Dose:  1 appful


Clonazepam (Klonopin)  0.25 mg PO BID [facility]


   PRN Reason: Protocol


   Last Admin: 01/02/18 10:14 Dose:  0.25 mg


Duloxetine HCl (Cymbalta)  20 mg PO HS [facility]


   Last Admin: 01/01/18 22:02 Dose:  20 mg


Folic Acid (Folic Acid)  1 mg PO DAILY [facility]


   Last Admin: 01/02/18 10:16 Dose:  1 mg


Furosemide (Lasix)  40 mg IVP Q12H [facility]


   Last Admin: 01/02/18 05:30 Dose:  40 mg


Gabapentin (Neurontin)  600 mg PO BID [facility]


   PRN Reason: Protocol


   Last Admin: 01/02/18 10:16 Dose:  600 mg


Home Med (Home Med)  1 unit IH DAILY [facility]


   Last Admin: 01/02/18 10:18 Dose:  Not Given


Home Med (Home Med)  1 unit IH DAILY [facility]


   Last Admin: 01/02/18 10:17 Dose:  1 unit


Home Med (Home Med)  1 unit IH DAILY [facility]


   Last Admin: 01/02/18 10:17 Dose:  1 unit


Lactic Acid (Lac-Hydrin 12% Lotion (225 G))  0 gm EXT DAILY [facility]


   Last Admin: 01/02/18 10:17 Dose:  1 unit


Methylprednisolone (Solu-Medrol)  20 mg IVP Q12 [facility]


   Last Admin: 01/02/18 10:13 Dose:  20 mg


Nicotine (Nicoderm Cq)  1 patch TD DAILY [facility]


   Last Admin: 01/02/18 10:18 Dose:  1 patch


Oxycodone HCl (Oxycodone Immediate Release Tab)  10 mg PO Q6H [facility]


   PRN Reason: Pain, severe (8-10)


   Last Admin: 01/02/18 05:33 Dose:  10 mg


Quetiapine Fumarate (Seroquel)  200 mg PO HS [facility]


   PRN Reason: Protocol


   Last Admin: 01/01/18 22:02 Dose:  200 mg


Sodium Chloride (Ocean Nasal Spray)  0 ml NS DAILY [facility]


   PRN Reason: Nasal congestion


   Last Admin: 12/28/17 14:49 Dose:  1 sprays


Spironolactone (Aldactone)  100 mg PO DAILY [facility]


   Last Admin: 01/02/18 10:14 Dose:  100 mg


Thiamine HCl (Vitamin B1 Tab)  100 mg PO DAILY [facility]


   Last Admin: 01/02/18 10:14 Dose:  100 mg











- Labs


Labs: 


 





 01/02/18 06:20 





 01/02/18 06:20 





 











PT  16.9 SECONDS (9.4-12.5)  H  12/26/17  06:40    


 


INR  1.52  (0.93-1.08)  H  12/26/17  06:40    


 


APTT  33.6 Seconds (25.1-36.5)   12/25/17  10:10    














Attending/Attestation





- Attestation


I have personally seen and examined this patient.: Yes


I have fully participated in the care of the patient.: Yes


I have reviewed all pertinent clinical information, including history, physical 

exam and plan: Yes

## 2017-12-31 NOTE — PN
DATE:  12/31/2017



SUBJECTIVE:  This patient was seen and evaluated earlier, appears

comfortable and tolerating diet.



PHYSICAL EXAMINATION

VITAL SIGNS:  Temperature is 98.5, pulse 55, and blood pressure is 149/93.

HEENT:  Atraumatic and anicteric.

NECK:  Supple.

HEART:  S1 and S2 heard.

LUNGS:  Bilateral air entry present.

ABDOMEN:  Distended.  Soft.

EXTREMITIES:  Edema present much improved.



LABORATORY DATA:  Hemoglobin 14.3, hematocrit 46.1, WBC 6.8, and platelets

132.  Chemistry is BUN 24, creatinine 0.7, ALT of 62, and alkaline

phosphatase is 73.



IMPRESSION AND PLAN:  This 57-year-old patient with a decompensated

cirrhosis probably secondary to active alcohol use, history of pulmonary

hypertension, chronic obstructive pulmonary disease, morbid obesity,

anxiety, and depression admitted with ascites and shortness of breath.  The

patient was  on p.o. Zithromax and meropenem has been changed to Augmentin.



The patient's ascitic fluid showed relative lymphocytosis, neutrophil count

is normal.  I will speak with Dr. Vergara, Infectious Disease regarding

these antibiotics.  



 The patient is presently on Lasix 40 mg IV q.12 hourly and

Aldactone 50 mg daily.  We will increase the dose of the Aldactone to 100

mg and we can able to reduce the dose of the Lasix to 40-60mg  eventually.

This importance of electrolytes follow up in this patient was stressed with 
diuretics therapy.

 

The patient clearly understood the importance of close medical followup

especially when he is on this diuretics regimen.



The patient was strictly advised to avoid alcohol and

stop smoking. 





__________________________________________

Silviano Bill MD





DD:  12/31/2017 18:07:08

DT:  12/31/2017 20:12:58

Job # [id number]





KARTHIK

## 2017-12-31 NOTE — PN
DATE:  12/31/2017



SUBJECTIVE:  Patient is in bed, in no acute distress, nontoxic.



PHYSICAL EXAMINATION:

VITAL SIGNS:  Temperature is 98, blood pressure is 130/90, respiratory rate

of 20.

HEENT:  Unremarkable.

NECK:  Supple.

LUNGS:  Have decreased breath sounds.

HEART:  Normal S1, S2.

ABDOMEN:  Soft, nontender.



LABORATORY EXAMINATION:  Reveals a white count of 6.8, hemoglobin of 14,

platelets of 132.  Chemistries reveal a BUN of 24, creatinine of 0.7. 

Urinalysis is noted.  Peritoneal fluid is noted.  Patient is currently on

p.o. Augmentin and p.o. Zithromax.



ASSESSMENT AND PLAN:  This is a 57-year-old male with morbid obesity, body

mass index of 46, spinal stenosis, arthritis, degenerative joint disease,

liver disease, depression, anxiety, cirrhosis of liver, chronic obstructive

lung disease, peripheral neuropathy, presented with,

1.  Sepsis with spontaneous bacterial peritonitis with peritoneal fluid WBC

count of over 500.  Had completed meropenem.  Currently on p.o. Zithromax

and p.o. Augmentin; complete 5 to 7 days of antibiotics.





__________________________________________

Corbin Vergara MD



DD:  12/31/2017 8:49:12

DT:  12/31/2017 8:52:02

Job # [id number]

## 2018-01-01 RX ADMIN — OXYCODONE HYDROCHLORIDE PRN MG: 10 TABLET ORAL at 06:37

## 2018-01-01 RX ADMIN — Medication SCH UNIT: at 09:31

## 2018-01-01 RX ADMIN — OXYCODONE HYDROCHLORIDE PRN MG: 10 TABLET ORAL at 12:36

## 2018-01-01 RX ADMIN — METHYLPREDNISOLONE SODIUM SUCCINATE SCH MG: 40 INJECTION, POWDER, FOR SOLUTION INTRAMUSCULAR; INTRAVENOUS at 22:01

## 2018-01-01 RX ADMIN — OXYCODONE HYDROCHLORIDE PRN MG: 10 TABLET ORAL at 18:24

## 2018-01-01 RX ADMIN — AMOXICILLIN AND CLAVULANATE POTASSIUM SCH TAB: 875; 125 TABLET, FILM COATED ORAL at 22:01

## 2018-01-01 RX ADMIN — METHYLPREDNISOLONE SODIUM SUCCINATE SCH MG: 40 INJECTION, POWDER, FOR SOLUTION INTRAMUSCULAR; INTRAVENOUS at 09:38

## 2018-01-01 RX ADMIN — ALBUTEROL SULFATE SCH MG: 1.25 SOLUTION RESPIRATORY (INHALATION) at 20:11

## 2018-01-01 RX ADMIN — AMOXICILLIN AND CLAVULANATE POTASSIUM SCH TAB: 875; 125 TABLET, FILM COATED ORAL at 09:35

## 2018-01-01 RX ADMIN — OXYCODONE HYDROCHLORIDE PRN MG: 10 TABLET ORAL at 23:58

## 2018-01-01 RX ADMIN — CLOTRIMAZOLE AND BETAMETHASONE DIPROPIONATE SCH APPFUL: 10; .5 CREAM TOPICAL at 09:32

## 2018-01-01 RX ADMIN — CLOTRIMAZOLE AND BETAMETHASONE DIPROPIONATE SCH APPFUL: 10; .5 CREAM TOPICAL at 18:21

## 2018-01-01 NOTE — PN
DATE:



SUBJECTIVE:  The patient is in bed, in no acute distress, nontoxic.



PHYSICAL EXAMINATION:

VITAL SIGNS:  On exam, temperature is 97, blood pressure is 115/70,

respiratory rate of 20, heart rate of 55.

HEENT:  Unremarkable.

NECK:  Supple.

LUNGS:  Have decreased breath sounds.

HEART:  Normal S1, S2.

ABDOMEN:  Soft, nontender.



LABORATORY EXAMINATION:  Reveals a white count of 6.8, hemoglobin of 14,

platelets of 132.  Chemistries reveal a BUN of 24, creatinine of 0.7.



Review of medications reveals the patient to be on Augmentin and

azithromycin.



ASSESSMENT AND PLAN:  A 57-year-old male with morbid obesity, body mass

index of 46, spinal stenosis, arthritis, degenerative joint disease, liver

disease, depression, anxiety, cirrhosis of the liver, chronic obstructive

lung disease, peripheral neuropathy, presented with sepsis with spontaneous

bacterial peritonitis with peritoneal fluid over 500 white blood cells and

completed meropenem.  Currently on p.o. Augmentin and p.o. Zithromax to

complete 5-7 days.  After that is completed, the patient may be on

spontaneous bacterial peritonitis, prophylaxis of Cipro 750 mg once weekly.

The patient's QTC on EKG is 398.





__________________________________________

Corbin Vergara MD



DD:  01/01/2018 10:27:12

DT:  01/01/2018 10:30:30

Job # [id number]

## 2018-01-01 NOTE — PN
PULMONARY PROGRESS NOTE



DATE:  12/31/2017



REFERRING PHYSICIAN:  Estela Stein MD



SUBJECTIVE:  He is ambulating in the room, feels better, still have cough,

sputum production.  No nausea, no vomiting, no diarrhea.  Increased

abdominal girth.  Leg swelling is better.



OBJECTIVE

GENERAL:  No acute distress.

VITAL SIGNS:  Temperature is 98, heart rate 55, respiratory rate is 20,

blood pressure 136/88 and pulse of 98% room air.  HEENT:  Moist mucous

membrane.  Crowded airway.  Mallampati score is IV.

NECK:  Supple.  No JVD.

LUNGS:  Has fair airflow with rhonchi.

HEART:  S1 and S2.

ABDOMEN:  Soft.  Distended.  Positive bowel sounds.  No tenderness.

EXTREMITIES:  Decreased edema.

NEUROLOGIC:  Awake, alert and follows simple command.



MEDICATIONS:  He is on Aldactone 50 mg daily, Augmentin 875 one tablet

twice a day, Cymbalta 20 mg daily, folic acid 1 mg daily, Klonopin 0.25 mg

twice a day, lactate at affected area twice a day, Lasix 40 mg twice a day,

Lotrisone topically twice a day, Neurontin 600 mg twice a day, Nicoderm

patch daily, nasal saline _____ q.6 hour, oxycodone immediate release 10 mg

q.6 hours p.r.n., Seroquel 200 mg at bedtime, Solu-Medrol 20 mg q. 12 hour,

vitamin B 100 mg daily and Zithromax 500 mg daily.



LABORATORY DATA:  Shows hemoglobin 14.3, hematocrit 46.1, WBC 6.8 and

platelet is 132.  Sodium 141, potassium 4.2, chloride 100, bicarbonate 31,

BUN 24, creatinine 0.7, glucose 111, calcium is 8.9, AST 53, ALT 62 and alk

phos is 73.  Albumin is 4.0.  Microbiology; blood culture has been

negative.



IMPRESSION AND PLAN:  Morbid obesity, sleep apnea syndrome, chronic

obstructive lung disease, ascites, pulmonary hypertension, anxiety

disorder, depression, history of suicidal ideation in the past, large

ascites, status post volume thoracentesis, lumbar radiculopathy.  Pulmonary

point of view doing okay.  Continue p.o. and inhaled bronchodilator,

antibiotics, fall precaution, diuretics.

















Thank you and we will follow with you





__________________________________________

Tor Jones MD





DD:  12/31/2017 19:17:04

DT:  12/31/2017 19:20:15

[facility] # [id number]

## 2018-01-01 NOTE — PN
DATE:  01/01/2018



REFERRING PHYSICIAN:  Estela Stein MD



SUBJECTIVE:  He is ambulating in the room, feels better.  Still has some

cough and sputum production.  No nausea, no vomiting, no diarrhea.  Still

have increased abdominal girth.  Leg swelling is better.



OBJECTIVE:

GENERAL:  In no acute distress.

VITAL SIGNS:  Temperature is 98, heart rate is 69, respiratory rate is 20,

blood pressure is 124/85, pulse oximetry is 95% on room air.

HEENT:  Moist mucous membranes.  No ulcer or thrush noted.

NECK:  Supple.  No JVD.

LUNGS:  Have a fair airflow with rhonchi.

HEART:  S1 and S2.

ABDOMEN:  Distended, soft.

EXTREMITIES: There is decreased edema.

NEUROLOGIC:  Awake, alert, follows simple commands.



MEDICATIONS:  He is on albuterol nebulizer q.6 hours; Aldactone _____ mg

daily; Augmentin 875 one tablet twice a day; Cymbalta 20 mg h.s.; folic

acid 1 mg daily; Klonopin 0.25 mg twice a day; Lasix 40 mg q. 12 hour;

Lotrisone to affected area twice a day; gabapentin 600 mg twice a day;

Nicoderm patch daily; nasal saline p.r.n. basis; oxycodone immediate

release 10 mg q.6 hours p.r.n.; Seroquel 200 mg h.s.; Solu-Medrol 20 mg q.

12 hours; vitamin B 100 mg daily; Zithromax 500 mg daily.



LABORATORY DATA:  Reviewed.  No new lab is available since yesterday. 

Microbiology:  Blood culture, peritoneal fluid  and sputum, there is no

growth.



IMPRESSION AND PLAN:  Morbid obesity, sleep apnea syndrome, chronic

obstructive lung disease, ascites, pulmonary hypertension, anxiety

disorder, depression, history of suicidal ideation in the past, ascites,

status post volume paracentesis, lumbar radiculopathy.  From pulmonary

point of view, doing okay.  Sleep apnea precaution.  Encourage CPAP use. 

Urge the patient to stop smoking.  Taper off steroids next 4 to 5 days. 

Discharge planning.



Thank you and we will follow with you.





__________________________________________

Tor Jones MD



DD:  01/01/2018 19:23:10

DT:  01/01/2018 20:09:48

[facility] # [id number]

## 2018-01-01 NOTE — CP.PCM.PN
<Ludwig Sanders - Last Filed: 01/01/18 11:49>





Subjective





- Date & Time of Evaluation


Date of Evaluation: 01/01/18


Time of Evaluation: 11:49





- Subjective


Subjective: 





Podiatry Progress Note for Dr. Buckley





57 y.o male seen at bedside for lower extremity  lower extremity swelling and 

edema. Patient is seen resting comfortably at bedside, in NAD, and AA0X3. 

Patient denies acute overnight events. Patient reports that he is feeling 

stronger today and his shortness of breath continues to improve. Patient denies 

n/v/sob/cp/chills/f or calf pain. No new pedal complaints.





Objective





- Vital Signs/Intake and Output


Vital Signs (last 24 hours): 


 











Temp Pulse Resp BP Pulse Ox


 


 97.8 F   61   20   115/79   99 


 


 01/01/18 07:30  01/01/18 07:30  01/01/18 07:30  01/01/18 07:30  01/01/18 07:30








Intake and Output: 


 











 01/01/18 01/01/18





 06:59 18:59


 


Intake Total 1260 


 


Output Total 800 


 


Balance 460 














- Medications


Medications: 


 Current Medications





Amoxicillin/Clavulanate Potassium (Augmentin 875 Mg-125 Mg Tab)  1 tab PO Q12 

[facility]


   PRN Reason: Protocol


   Stop: 01/06/18 22:01


   Last Admin: 01/01/18 09:35 Dose:  1 tab


Azithromycin (Zithromax)  500 mg PO DAILY [facility]


   PRN Reason: Protocol


   Last Admin: 01/01/18 09:37 Dose:  500 mg


Betamethasone/Clotrimazole (Lotrisone)  0 gm TOP BID [facility]


   Last Admin: 01/01/18 09:32 Dose:  1 appful


Clonazepam (Klonopin)  0.25 mg PO BID [facility]


   PRN Reason: Protocol


   Last Admin: 01/01/18 09:34 Dose:  0.25 mg


Duloxetine HCl (Cymbalta)  20 mg PO HS [facility]


   Last Admin: 12/31/17 22:04 Dose:  20 mg


Folic Acid (Folic Acid)  1 mg PO DAILY [facility]


   Last Admin: 01/01/18 09:37 Dose:  1 mg


Furosemide (Lasix)  40 mg IVP Q12H [facility]


   Last Admin: 01/01/18 06:36 Dose:  40 mg


Gabapentin (Neurontin)  600 mg PO BID [facility]


   PRN Reason: Protocol


   Last Admin: 01/01/18 09:37 Dose:  600 mg


Home Med (Home Med)  1 unit IH DAILY [facility]


   Last Admin: 12/31/17 10:59 Dose:  Not Given


Home Med (Home Med)  1 unit IH DAILY [facility]


   Last Admin: 01/01/18 09:33 Dose:  1 unit


Home Med (Home Med)  1 unit IH DAILY [facility]


   Last Admin: 01/01/18 09:33 Dose:  1 unit


Lactic Acid (Lac-Hydrin 12% Lotion (225 G))  0 gm EXT DAILY [facility]


   Last Admin: 01/01/18 09:31 Dose:  1 unit


Methylprednisolone (Solu-Medrol)  20 mg IVP Q12 [facility]


   Last Admin: 01/01/18 09:38 Dose:  20 mg


Nicotine (Nicoderm Cq)  1 patch TD DAILY [facility]


   Last Admin: 01/01/18 09:37 Dose:  1 patch


Oxycodone HCl (Oxycodone Immediate Release Tab)  10 mg PO Q6H [facility]


   PRN Reason: Pain, severe (8-10)


   Last Admin: 01/01/18 06:37 Dose:  10 mg


Quetiapine Fumarate (Seroquel)  200 mg PO HS [facility]


   PRN Reason: Protocol


   Last Admin: 12/31/17 22:03 Dose:  200 mg


Sodium Chloride (Ocean Nasal Spray)  0 ml NS DAILY [facility]


   PRN Reason: Nasal congestion


   Last Admin: 12/28/17 14:49 Dose:  1 sprays


Spironolactone (Aldactone)  50 mg PO DAILY [facility]


   Last Admin: 01/01/18 09:34 Dose:  50 mg


Thiamine HCl (Vitamin B1 Tab)  100 mg PO DAILY [facility]


   Last Admin: 01/01/18 09:37 Dose:  100 mg











- Labs


Labs: 


 





 12/31/17 07:00 





 12/31/17 07:00 





 











PT  16.9 SECONDS (9.4-12.5)  H  12/26/17  06:40    


 


INR  1.52  (0.93-1.08)  H  12/26/17  06:40    


 


APTT  33.6 Seconds (25.1-36.5)   12/25/17  10:10    














- Constitutional


Appears: Well, Non-toxic, No Acute Distress





- Extremities Exam


Additional comments: 


Lower extremities focused examination:





Vasc: DP and PT 2/4 bilaterally, CFT < 3 seconds to digits, temperature 

gradient WNL, pitting edema noted to the LE


Ortho: no pain with palpation to the lower extremity, no pain with calf squeeze

, MM is 5/5 in all four compartments: dorsiflexion, plantarflexion, inversion, 

eversion


Neuro: gross and protective sensation diminished


Derm: reduced erythema noted to the entire LE bilaterally, mild xerosis to the 

LE noted, more prominent to the anterior ankles b/l, has improved significantly

, no open lesions noted, no active drainage, no tunneling, no undermining no 

probe to bone, no open lesions to the LE, hyperpigmentation noted to the entire 

anterior LE b/l.





- Neurological Exam


Neurological Exam: Alert, Awake, Oriented x3





- Psychiatric Exam


Psychiatric exam: Normal Affect, Normal Mood





Assessment and Plan





- Assessment and Plan (Free Text)


Assessment: 





57 y.o male wit bilaterally lower extremity edema with erythema and xerosis - 

improving


Plan: 


Patient examined and evaluated at bedside


Charts, labs, vitals reviewed (afebrile, WBC=6.8 on 12/31/17 absent leukocytosis

)


Discussed the plan in detail with attending


Lotrisone cream applied to LE, followed by Lac-Hydrin


Ordered new lotrisone cream


Instructed on the importance of wearing compression stockings, especially 

during ambulating


Compression stockings may be removed in PM prior to sleeping


Patient is stable per podiatry standpoint


Podiatry will continue to follow while in house


Upon discharge, will f/u in office with Dr. Buckley











<Evy Buckley - Last Filed: 01/04/18 14:24>





Objective





- Vital Signs/Intake and Output


Vital Signs (last 24 hours): 


 











Temp Pulse Resp BP Pulse Ox


 


 98.5 F   70   20   134/82   96 


 


 01/02/18 15:51  01/02/18 15:51  01/02/18 15:51  01/02/18 15:51  01/02/18 15:51











- Labs


Labs: 


 





 01/02/18 06:20 





 01/02/18 06:20 





 











PT  16.9 SECONDS (9.4-12.5)  H  12/26/17  06:40    


 


INR  1.52  (0.93-1.08)  H  12/26/17  06:40    


 


APTT  33.6 Seconds (25.1-36.5)   12/25/17  10:10    














Attending/Attestation





- Attestation


I have personally seen and examined this patient.: Yes


I have fully participated in the care of the patient.: Yes


I have reviewed all pertinent clinical information, including history, physical 

exam and plan: Yes

## 2018-01-01 NOTE — PN
DATE:  01/01/2018



SUBJECTIVE:  The patient is feeling much better.



PHYSICAL EXAMINATION

VITAL SIGNS:  Temperature is afebrile, blood pressure 138/90, pulse 69.

HEENT:  Atraumatic, anicteric.

NECK:  Supple.

HEART:  S1 and S2 heard.

LUNGS:  Bilateral air entry present.

ABDOMEN:  Softly distended.  Bowel sounds present.

EXTREMITIES:  Edema present.

NEUROLOGICAL:  Alert and oriented.  Moves all the extremities.



LABORATORY DATA:  There are no recent labs today.



IMPRESSION:

1.  This 57-year-old patient admitted with decompensated cirrhosis,

ascites, status post large volume paracentesis, had a paracentesis, which

showed peritoneal fluid mainly lymphocytosis.  On p.o. antibiotics.

2.  History of chronic obstructive lung disease.

3.  Morbid obesity.

4.  Depression.



PLAN:  The patient is presently on Aldactone 50 mg daily and Lasix 40 mg q.

12 hourly.  We will check the electrolytes tomorrow, then adjust the dose

of the Aldactone to 100 mg.  The patient was strictly advised also to avoid

alcohol and also smoking.



Thank you very much for allowing us to participate in the care the patient.





__________________________________________

Silviano Bill MD



DD:  01/01/2018 16:36:00

DT:  01/01/2018 18:52:38

[facility] # [id number]

## 2018-01-02 VITALS — TEMPERATURE: 98.5 F | HEART RATE: 70 BPM | SYSTOLIC BLOOD PRESSURE: 134 MMHG | DIASTOLIC BLOOD PRESSURE: 82 MMHG

## 2018-01-02 VITALS — RESPIRATION RATE: 20 BRPM | OXYGEN SATURATION: 96 %

## 2018-01-02 LAB
ALBUMIN SERPL-MCNC: 4.2 G/DL (ref 3–4.8)
ALBUMIN/GLOB SERPL: 1.3 {RATIO} (ref 1.1–1.8)
ALT SERPL-CCNC: 61 U/L (ref 7–56)
AST SERPL-CCNC: 46 U/L (ref 17–59)
BASOPHILS # BLD AUTO: 0.01 K/MM3 (ref 0–2)
BASOPHILS NFR BLD: 0.1 % (ref 0–3)
BUN SERPL-MCNC: 23 MG/DL (ref 7–21)
CALCIUM SERPL-MCNC: 9.2 MG/DL (ref 8.4–10.5)
EOSINOPHIL # BLD: 0 10*3/UL (ref 0–0.7)
EOSINOPHIL NFR BLD: 0.3 % (ref 1.5–5)
ERYTHROCYTE [DISTWIDTH] IN BLOOD BY AUTOMATED COUNT: 14 % (ref 11.5–14.5)
GFR NON-AFRICAN AMERICAN: > 60
GRANULOCYTES # BLD: 5.64 10*3/UL (ref 1.4–6.5)
GRANULOCYTES NFR BLD: 77.8 % (ref 50–68)
HGB BLD-MCNC: 14.5 G/DL (ref 14–18)
LYMPHOCYTES # BLD: 0.7 10*3/UL (ref 1.2–3.4)
LYMPHOCYTES NFR BLD AUTO: 9 % (ref 22–35)
MCH RBC QN AUTO: 28.2 PG (ref 25–35)
MCHC RBC AUTO-ENTMCNC: 31.7 G/DL (ref 31–37)
MCV RBC AUTO: 88.7 FL (ref 80–105)
MONOCYTES # BLD AUTO: 0.9 10*3/UL (ref 0.1–0.6)
MONOCYTES NFR BLD: 12.8 % (ref 1–6)
PLATELET # BLD: 135 10^3/UL (ref 120–450)
PMV BLD AUTO: 11.3 FL (ref 7–11)
RBC # BLD AUTO: 5.15 10^6/UL (ref 3.5–6.1)
WBC # BLD AUTO: 7.3 10^3/UL (ref 4.5–11)

## 2018-01-02 RX ADMIN — CLOTRIMAZOLE AND BETAMETHASONE DIPROPIONATE SCH APPFUL: 10; .5 CREAM TOPICAL at 10:14

## 2018-01-02 RX ADMIN — OXYCODONE HYDROCHLORIDE PRN MG: 10 TABLET ORAL at 05:33

## 2018-01-02 RX ADMIN — OXYCODONE HYDROCHLORIDE PRN MG: 10 TABLET ORAL at 18:30

## 2018-01-02 RX ADMIN — AMOXICILLIN AND CLAVULANATE POTASSIUM SCH TAB: 875; 125 TABLET, FILM COATED ORAL at 10:14

## 2018-01-02 RX ADMIN — CLOTRIMAZOLE AND BETAMETHASONE DIPROPIONATE SCH APPFUL: 10; .5 CREAM TOPICAL at 18:45

## 2018-01-02 RX ADMIN — Medication SCH UNIT: at 10:17

## 2018-01-02 RX ADMIN — ALBUTEROL SULFATE SCH MG: 1.25 SOLUTION RESPIRATORY (INHALATION) at 01:36

## 2018-01-02 RX ADMIN — ALBUTEROL SULFATE SCH MG: 1.25 SOLUTION RESPIRATORY (INHALATION) at 13:47

## 2018-01-02 RX ADMIN — ALBUTEROL SULFATE SCH MG: 1.25 SOLUTION RESPIRATORY (INHALATION) at 07:28

## 2018-01-02 RX ADMIN — OXYCODONE HYDROCHLORIDE PRN MG: 10 TABLET ORAL at 11:56

## 2018-01-02 RX ADMIN — METHYLPREDNISOLONE SODIUM SUCCINATE SCH MG: 40 INJECTION, POWDER, FOR SOLUTION INTRAMUSCULAR; INTRAVENOUS at 10:13

## 2018-01-02 NOTE — CP.PCM.PN
Subjective





- Date & Time of Evaluation


Date of Evaluation: 01/02/18


Time of Evaluation: 11:45





- Subjective


Subjective: 





Comfortable, no abdominal pain. No fevers.





Objective





- Vital Signs/Intake and Output


Vital Signs (last 24 hours): 


 











Temp Pulse Resp BP Pulse Ox


 


 98.5 F   70   20   134/82   96 


 


 01/02/18 15:51  01/02/18 15:51  01/02/18 15:51  01/02/18 15:51  01/02/18 15:51











- Medications


Medications: 


 Current Medications





Albuterol Sulfate (Albuterol 0.042% Inhal Sol (1.25mg/3ml) Ud)  1.25 mg IH 

[id number] [facility]


   Stop: 01/02/18 20:00


   Last Admin: 01/02/18 13:47 Dose:  1.25 mg


Amoxicillin/Clavulanate Potassium (Augmentin 875 Mg-125 Mg Tab)  1 tab PO Q12 

[facility]


   PRN Reason: Protocol


   Stop: 01/06/18 22:01


   Last Admin: 01/02/18 10:14 Dose:  1 tab


Azithromycin (Zithromax)  500 mg PO DAILY [facility]


   PRN Reason: Protocol


   Last Admin: 01/02/18 10:14 Dose:  500 mg


Betamethasone/Clotrimazole (Lotrisone)  0 gm TOP BID [facility]


   Last Admin: 01/02/18 18:45 Dose:  1 appful


Clonazepam (Klonopin)  0.25 mg PO BID [facility]


   PRN Reason: Protocol


   Last Admin: 01/02/18 18:45 Dose:  Not Given


Duloxetine HCl (Cymbalta)  20 mg PO HS [facility]


   Last Admin: 01/01/18 22:02 Dose:  20 mg


Folic Acid (Folic Acid)  1 mg PO DAILY [facility]


   Last Admin: 01/02/18 10:16 Dose:  1 mg


Furosemide (Lasix)  40 mg IVP Q12H [facility]


   Last Admin: 01/02/18 18:45 Dose:  Not Given


Gabapentin (Neurontin)  600 mg PO BID [facility]


   PRN Reason: Protocol


   Last Admin: 01/02/18 18:44 Dose:  Not Given


Home Med (Home Med)  1 unit IH DAILY [facility]


   Last Admin: 01/02/18 10:18 Dose:  Not Given


Home Med (Home Med)  1 unit IH DAILY [facility]


   Last Admin: 01/02/18 10:17 Dose:  1 unit


Home Med (Home Med)  1 unit IH DAILY [facility]


   Last Admin: 01/02/18 10:17 Dose:  1 unit


Lactic Acid (Lac-Hydrin 12% Lotion (225 G))  0 gm EXT DAILY [facility]


   Last Admin: 01/02/18 10:17 Dose:  1 unit


Methylprednisolone (Solu-Medrol)  20 mg IVP Q12 [facility]


   Last Admin: 01/02/18 10:13 Dose:  20 mg


Nicotine (Nicoderm Cq)  1 patch TD DAILY [facility]


   Last Admin: 01/02/18 10:18 Dose:  1 patch


Oxycodone HCl (Oxycodone Immediate Release Tab)  10 mg PO Q6H [facility]


   PRN Reason: Pain, severe (8-10)


   Last Admin: 01/02/18 18:30 Dose:  10 mg


Quetiapine Fumarate (Seroquel)  200 mg PO HS [facility]


   PRN Reason: Protocol


   Last Admin: 01/01/18 22:02 Dose:  200 mg


Sodium Chloride (Ocean Nasal Spray)  0 ml NS DAILY [facility]


   PRN Reason: Nasal congestion


   Last Admin: 12/28/17 14:49 Dose:  1 sprays


Spironolactone (Aldactone)  100 mg PO DAILY [facility]


   Last Admin: 01/02/18 10:14 Dose:  100 mg


Thiamine HCl (Vitamin B1 Tab)  100 mg PO DAILY [facility]


   Last Admin: 01/02/18 10:14 Dose:  100 mg











- Labs


Labs: 


 





 01/02/18 06:20 





 01/02/18 06:20 





 











PT  16.9 SECONDS (9.4-12.5)  H  12/26/17  06:40    


 


INR  1.52  (0.93-1.08)  H  12/26/17  06:40    


 


APTT  33.6 Seconds (25.1-36.5)   12/25/17  10:10    














- Constitutional


Appears: Non-toxic, No Acute Distress





- Head Exam


Head Exam: NORMAL INSPECTION





- ENT Exam


ENT Exam: Mucous Membranes Moist





- Neck Exam


Neck Exam: absent: Meningismus





- Respiratory Exam


Respiratory Exam: Decreased Breath Sounds





- Cardiovascular Exam


Cardiovascular Exam: +S1, +S2





- GI/Abdominal Exam


GI & Abdominal Exam: Soft.  absent: Tenderness





Assessment and Plan





- Assessment and Plan (Free Text)


Plan: 





Assessment


Sepsis due to spontaneous bacterial peritonitis


morbid obesity with BMI 46


liver cirrhosis


degenerative joint disease


COPD


peripheral neuropathy


depression and anxiety





Plan


Continue Augmentin for 4-6 more days, then switch to PO Cipro qweekly for 

prophylaxis

## 2018-01-02 NOTE — PN
DATE:  12/31/2017



SUBJECTIVE:  The patient is a 57-year-old male.  The patient is seen and

examined at the bedside sitting on the chair, looking comfortable, doing

physical therapy.  No nausea, vomiting, or diarrhea.  No hematuria or

hematochezia.  Leg swelling is better.  No headache.  No dizziness.



PHYSICAL EXAMINATION:

VITAL SIGNS:  Temperature 98.5, pulse 55, blood pressure 149/93, and

respiratory 20.

HEENT:  Head; normocephalic and atraumatic.  Eyes; PERRLA.  Extraocular

muscles intact.  Conjunctivae clear.  Nose patent.  Mucous membranes moist.

NECK:  Supple.  No carotid bruits.  No JVD or thyromegaly.

CHEST:  Bilaterally symmetrical.

HEART:  S1 and S2 positive.

LUNGS:  Clear to auscultation.

ABDOMEN:  Soft.  Bowel sounds positive.  No organomegaly.

EXTREMITIES:  No edema.  No cyanosis.

NEUROLOGIC:  The patient is awake and alert.  Moving all four extremities. 

No focal deficits.



MEDICATIONS:  Aldactone, Augmentin, Cymbalta, folic acid, clonazepam,

Lac-Hydrin, Lasix, Lotrisone, Neurontin, Nicoderm, oxycodone, Seroquel,

Solu-Medrol, thiamine, Zithromax.



LABORATORY DATA:  White blood cell 6.8, hemoglobin 14.3, hematocrit 46.1,

and platelets 132.  Sodium 141, potassium 4.2, BUN 24, creatinine 0.7,

glucose 111.



ASSESSMENT AND PLAN:  Mr. Bobby Urena is a 57-year-old male with renal

insufficiency, hyperglycemia, abnormal liver function test, have human

immunodeficiency virus 1, 2, and 4 duration negative.  Seen by Dr. Vergara, infectious disease.  History of ethanol abuse came with

cirrhosis of the liver, morbid obesity, body mass index of 46, spinal

stenosis, arthritis, degenerative joint disease, depression, anxiety,

chronic obstructive pulmonary disease, peripheral neuropathy, sepsis,

spontaneous bacterial peritonitis, peritoneal fluid over 500, completed

meropenem therapy, currently on Cymbalta and Zithromax.  Dr. Vergara

change meropenem to Augmentin and complete p.o. Zithromax for 5 to 7 days. 

Podiatrist is on the case also.  Dermatitis of the leg is improving. 

History of suicidal ideation in the past, but right now he is comfortable. 

Continue inhaled bronchodilators, diuretics, gastric and deep venous

thrombosis prophylaxis, encourage BiPAP use while sleeping.  Repeat labs. 

We will followup.





















__________________________________________

Estela Stein MD





DD:  12/31/2017 17:25:50

DT:  12/31/2017 20:52:11

Job # [id number]

## 2018-01-02 NOTE — PN
DATE:



The patient is 57-year-old male.



SUBJECTIVE:  The patient is examined on the bedside, sitting on the chair,

feeling congested, coughing.  Explaining that he is wheezing.  The patient

denies any overnight events.  No nausea, vomiting or diarrhea.  Swelling of

the leg is improving.



PHYSICAL EXAMINATION:

VITAL SIGNS:  Temperature is 97.8, pulse 51, respiratory rate 20, blood

pressure 115/79 and pulse oximetry 99.

HEENT:  Head normocephalic and atraumatic.  Eyes:  PERRLA.  Extraocular

muscles intact.  Conjunctivae clear.  Nose patent.  Mucous membrane moist.

NECK:  Supple.  No carotid bruits.  No JVD or thyromegaly.

CHEST:  Bilaterally symmetrical.

HEART:  S1 and S2 positive.

LUNGS:  Clear to auscultation.

ABDOMEN:  Soft.  Bowel sounds positive.  No organomegaly.

EXTREMITIES:  Trace edema.  No cyanosis.

NEUROLOGIC:  The patient is awake and alert.  Moving all 4 extremities.  No

focal deficits.



MEDICATIONS:  Augmentin, Zithromax, Lotrisone, Klonopin, Cymbalta, folic

acid, Lasix, Neurontin, oxycodone, Seroquel, normal saline and Aldactone.



LABORATORY DATA:  White blood cell is 6.8, hemoglobin 14.3, hematocrit 46.1

and platelets 132.  Sodium 141, potassium 4.2, BUN 24, creatinine 0.7 and

glucose 111.



ASSESSMENT AND PLAN:  Mr. Bobby Urena is a 57-year-old male with renal

insufficiency, hyperglycemia, has bilateral lower extremities edema with

xerosis improving, history of obesity, cirrhosis of the liver, ascites

status post paracentesis, chronic back pain, stenosis of the back, chronic

obstructive pulmonary disease, obstructive sleep apnea syndrome, pulmonary

hypertension, anxiety disorder, depression, history of suicidal ideation in

the past, lumbar radiculopathy.  We will continue inhaled bronchodilators. 

Fall precautions.  The patient was taking nebulizer treatment p.r.n., I

will make round the clock for 24 hours.  Gastrointestinal and deep vein

thrombosis prophylaxis.  Repeat labs.  We will follow up.





__________________________________________

Estela Stein MD



DD:  01/01/2018 21:13:43

DT:  01/01/2018 21:34:08

Job # [id number]

## 2018-01-02 NOTE — PN
PULMONARY PROGRESS NOTE



DATE: 01/02/2018



REFERRING PHYSICIAN:  Estela Stein MD



SUBJECTIVE:  The patient is out of bed to chair.  Night was unremarkable. 

Feels much better.  Tolerating BiPAP well.  Cough has improved.  No nausea,

no vomiting, no diarrhea.  Leg swelling has improved.



OBJECTIVE:

GENERAL:  In no acute distress.

VITAL SIGNS:  Temperature is 98, heart rate is 70, respiratory rate is 20,

blood pressure is 134/82, pulse oximetry is 96% on room air.

HEENT:  Moist mucous membranes.  Crowded airway.  Mallampati score is IV.

NECK:  Supple.  No JVD.

LUNGS:  Have a fair airflow with few rhonchi.

HEART:  S1 and S2.

ABDOMEN:  Soft and nontender.  No organomegaly.

EXTREMITIES:  Decreased edema.

NEUROLOGIC:  Awake, alert and follows simple commands.



MEDICATIONS:  He is on albuterol inhaled q.6 hours, Aldactone 100 mg daily,

Augmentin 875/125 one tablet q.12 hours,  Cymbalta 20 mg h.s., folic acid 1

mg daily, Klonopin 0.25 mg twice a day, Lasix 40 mg q. 12 hour, Lotrisone

twice a day,  gabapentin 600 mg twice a day, Nicoderm patch weekly,

oxycodone immediate release 10 mg q.6 hours p.r.n., Seroquel 200 mg h.s.,

Solu-Medrol 20 mg IV q.12 hours, vitamin B 100 mg daily, Zithromax 500 mg

daily.



LABORATORY DATA:  Shows hemoglobin 14.5, hematocrit 45.7, WBC 7.3 and

platelet is 135.  Sodium 142, potassium 4.4, chloride 101, bicarbonate 30,

BUN 23, creatinine 0.7, glucose 126, calcium 9.2, total bilirubin 1.4, AST

46, ALT 61,  alkaline phosphatase is 80, albumin is 4.2.



IMPRESSION AND PLAN:  Morbid obesity, sleep apnea syndrome, chronic

obstructive lung disease, ascites, pulmonary hypertension, anxiety

disorder, depression, history of suicidal ideation in the past, ascites,

status post volume paracentesis, lumbar radiculopathy.  From pulmonary

point of view, doing well.  Urge him to stop smoking.  Agree with discharge

planning on inhaled bronchodilator, fall precautions, diuretics, outpatient

PFT, also suggested to continue CPAP while sleeping.



Thank you and we will follow with you.





__________________________________________

Tor Jones MD





DD:  01/02/2018 18:03:05

DT:  01/02/2018 18:55:15

[facility] # [id number]

## 2018-01-02 NOTE — PN
DATE:  12/30/2017



SUBJECTIVE:  This patient was seen and elevated earlier today.  The patient

appears much more comfortable.  Tolerating diet.  No complaints of any

abdominal pain, ambulating.



PHYSICAL EXAMINATION:

VITAL SIGNS:  Temperature is 97.8, pulse is 68, blood pressure is 121/80.

HEENT:  Atraumatic and anicteric.

NECK:  Supple.

HEART:  S1 and S2 heard.

LUNGS:  Bilateral air entry present.

ABDOMEN:  Soft, distended.

EXTREMITIES:  Edema present.  Erythema has significantly improved.



LABORATORY DATA:  No labs today.



IMPRESSION:  This is a 57-year-old patient with decompensated cirrhosis,

ascites had status post large volume paracentesis done.



The ascites fluid analysis showed mainly relative lymphocytosis.  The

neutrophil count is less than 250.  We will discuss with ID regarding this

and antibiotic coverage.



The patient has history of active chronic obstructive pulmonary disease,

pulmonary hypertension, morbid obesity and history of active EtOH use.



The patient's other comorbidities include anxiety and depression.



PLAN AND RECOMMENDATIONS:

1.  The patient is presently on IV Lasix 40 mg q. 12 hourly and also

Aldactone 50 mg.  We will slowly titrate the dose of Aldactone up and

reduce the Lasix down based on the electrolytes.

2.  The patient was strictly advised to avoid alcohol.

3.  The patient is on antibiotics Zithromax and also on meropenem.  The

patient is also on prednisolone.



Thank you very much for allowing us to participate in the care of the

patient.





__________________________________________

Silviano Bill MD



DD:  12/30/2017 16:11:48

DT:  12/30/2017 18:16:05

Job # [id number]

## 2018-01-02 NOTE — CP.PCM.PN
<Jose Andino - Last Filed: 01/02/18 15:58>





Subjective





- Date & Time of Evaluation


Date of Evaluation: 01/02/18


Time of Evaluation: 15:58





- Subjective


Subjective: 





Podiatry Progress Note for Dr. Pierre





57 y.o male seen at bedside for lower extremity edema. Patient is seen resting 

comfortably at bedside, in NAD, and AA0X3. Patient denies acute overnight 

events. Patient reports that he is feeling stronger today and his shortness of 

breath continues to improve. States that he has been walking laps around the 

floor without problem. Patient denies n/v/sob/cp/chills/f or calf pain. No new 

pedal complaints. Patient states he is to be discharged home today





Objective





- Vital Signs/Intake and Output


Vital Signs (last 24 hours): 


 











Temp Pulse Resp BP Pulse Ox


 


 98.5 F   70   20   134/82   96 


 


 01/02/18 15:51  01/02/18 15:51  01/02/18 15:51  01/02/18 15:51  01/02/18 15:51








Intake and Output: 


 











 01/02/18 01/02/18





 06:59 18:59


 


Intake Total 420 


 


Balance 420 














- Medications


Medications: 


 Current Medications





Albuterol Sulfate (Albuterol 0.042% Inhal Sol (1.25mg/3ml) Ud)  1.25 mg IH 

H2UCLWH ALVARO


   Stop: 01/02/18 20:00


   Last Admin: 01/02/18 13:47 Dose:  1.25 mg


Amoxicillin/Clavulanate Potassium (Augmentin 875 Mg-125 Mg Tab)  1 tab PO Q12 

[facility]


   PRN Reason: Protocol


   Stop: 01/06/18 22:01


   Last Admin: 01/02/18 10:14 Dose:  1 tab


Azithromycin (Zithromax)  500 mg PO DAILY [facility]


   PRN Reason: Protocol


   Last Admin: 01/02/18 10:14 Dose:  500 mg


Betamethasone/Clotrimazole (Lotrisone)  0 gm TOP BID [facility]


   Last Admin: 01/02/18 10:14 Dose:  1 appful


Clonazepam (Klonopin)  0.25 mg PO BID [facility]


   PRN Reason: Protocol


   Last Admin: 01/02/18 10:14 Dose:  0.25 mg


Duloxetine HCl (Cymbalta)  20 mg PO HS [facility]


   Last Admin: 01/01/18 22:02 Dose:  20 mg


Folic Acid (Folic Acid)  1 mg PO DAILY [facility]


   Last Admin: 01/02/18 10:16 Dose:  1 mg


Furosemide (Lasix)  40 mg IVP Q12H [facility]


   Last Admin: 01/02/18 05:30 Dose:  40 mg


Gabapentin (Neurontin)  600 mg PO BID [facility]


   PRN Reason: Protocol


   Last Admin: 01/02/18 10:16 Dose:  600 mg


Home Med (Home Med)  1 unit IH DAILY [facility]


   Last Admin: 01/02/18 10:18 Dose:  Not Given


Home Med (Home Med)  1 unit IH DAILY [facility]


   Last Admin: 01/02/18 10:17 Dose:  1 unit


Home Med (Home Med)  1 unit IH DAILY [facility]


   Last Admin: 01/02/18 10:17 Dose:  1 unit


Lactic Acid (Lac-Hydrin 12% Lotion (225 G))  0 gm EXT DAILY [facility]


   Last Admin: 01/02/18 10:17 Dose:  1 unit


Methylprednisolone (Solu-Medrol)  20 mg IVP Q12 [facility]


   Last Admin: 01/02/18 10:13 Dose:  20 mg


Nicotine (Nicoderm Cq)  1 patch TD DAILY [facility]


   Last Admin: 01/02/18 10:18 Dose:  1 patch


Oxycodone HCl (Oxycodone Immediate Release Tab)  10 mg PO Q6H [facility]


   PRN Reason: Pain, severe (8-10)


   Last Admin: 01/02/18 11:56 Dose:  10 mg


Quetiapine Fumarate (Seroquel)  200 mg PO HS [facility]


   PRN Reason: Protocol


   Last Admin: 01/01/18 22:02 Dose:  200 mg


Sodium Chloride (Ocean Nasal Spray)  0 ml NS DAILY [facility]N


   PRN Reason: Nasal congestion


   Last Admin: 12/28/17 14:49 Dose:  1 sprays


Spironolactone (Aldactone)  100 mg PO DAILY [facility]


   Last Admin: 01/02/18 10:14 Dose:  100 mg


Thiamine HCl (Vitamin B1 Tab)  100 mg PO DAILY [facility]


   Last Admin: 01/02/18 10:14 Dose:  100 mg











- Labs


Labs: 


 





 01/02/18 06:20 





 01/02/18 06:20 





 











PT  16.9 SECONDS (9.4-12.5)  H  12/26/17  06:40    


 


INR  1.52  (0.93-1.08)  H  12/26/17  06:40    


 


APTT  33.6 Seconds (25.1-36.5)   12/25/17  10:10    














- Constitutional


Appears: Well, Non-toxic, No Acute Distress





- Extremities Exam


Additional comments: 





Lower extremities focused examination:





Vasc: DP and PT 2/4 bilaterally, CFT < 3 seconds to digits, temperature 

gradient WNL, pitting edema noted to the LE


Ortho: no pain with palpation to the lower extremity, no pain with calf squeeze

, MM is 5/5 in all four compartments: dorsiflexion, plantarflexion, inversion, 

eversion


Neuro: gross and protective sensation diminished


Derm: reduced erythema noted to the entire LE bilaterally, mild xerosis to the 

LE noted, more prominent to the anterior ankles b/l, has improved significantly

, no open lesions noted, no active drainage, no tunneling, no undermining no 

probe to bone, no open lesions to the LE, hyperpigmentation noted to the entire 

anterior LE b/l.





- Neurological Exam


Neurological Exam: Alert, Awake, Oriented x3





- Psychiatric Exam


Psychiatric exam: Normal Affect, Normal Mood





Assessment and Plan





- Assessment and Plan (Free Text)


Assessment: 





57 y.o male wit bilaterally lower extremity edema with erythema and xerosis - 

improving


Plan: 





Patient seen and evaluated at bedside


Plan discussed with attending Dr. Pierre


Afebrile, absent leukocytosis


LE US 12/25- No evidence of DVT b/l


Continue IV abx per ID


Applied Lotrisone and Lac Hydrin to b/l LE


Applied compressive socks to b/l LE


Advised patient to f/u with Dr. Buckley as an outpatient following DC





<Steve Pierre - Last Filed: 01/03/18 11:15>





Objective





- Vital Signs/Intake and Output


Vital Signs (last 24 hours): 


 











Temp Pulse Resp BP Pulse Ox


 


 98.5 F   70   20   134/82   96 


 


 01/02/18 15:51  01/02/18 15:51  01/02/18 15:51  01/02/18 15:51  01/02/18 15:51











- Labs


Labs: 


 





 01/02/18 06:20 





 01/02/18 06:20 





 











PT  16.9 SECONDS (9.4-12.5)  H  12/26/17  06:40    


 


INR  1.52  (0.93-1.08)  H  12/26/17  06:40    


 


APTT  33.6 Seconds (25.1-36.5)   12/25/17  10:10    














Attending/Attestation





- Attestation


I have personally seen and examined this patient.: Yes


I have fully participated in the care of the patient.: Yes


I have reviewed all pertinent clinical information, including history, physical 

exam and plan: Yes

## 2018-01-03 NOTE — PN
DATE:



SUBJECTIVE:  This patient was seen and evaluated today.  The patient is

doing much better now.  Tolerating the diet.  No complaints of any

abdominal pain, plan for discharge.



PHYSICAL EXAMINATION

VITAL SIGNS:  Temperature 98.5, pulse 70, blood pressure 134/82,

respirations 20, O2 salutation 96%.

HEENT:  Atraumatic.  Anicteric.

NECK:  Supple.

HEART:  S1 and S2 heard.

LUNGS:  Bilateral air entry present.

ABDOMEN:  Soft, distended.  Ascites present.

EXTREMITIES:  Edema significantly improved.



LABORATORY DATA:  Hemoglobin 14.5, hematocrit 45.7, WBC 7.3, and platelets

135.  BUN 23, creatinine 0.7, total bilirubin 1.4.



IMPRESSION:  This 57-year-old patient with past medical history of

decompensated cirrhosis, probably secondary to alcohol, status post large

volume paracentesis which showed peritoneal fluid, mainly lymphocytosis, on

antibiotics.



The patient's antibiotics changed to Zithromax (Z-Lawrence) and Augmentin.  The

patient has chronic obstructive pulmonary disease exacerbation, morbid

obesity, depression, obstructive sleep apnea.



RECOMMENDATIONS:  The dose of the Aldactone has been increased to 100 mg

and the patient is going to be discharged home and the discharge medication

changed to 50 mg of Aldactone and also 40 mg of Lasix.  The patient is

continuing Zithromax and Augmentin.  The patient is due to follow up with

primary care and advised GI followup after seen by the primary physician. 

The importance of the medical followup and follow up of the electrolytes

and blood test explained to the patient.



The patient was strictly advised to avoid smoking and alcohol.



Thank you very much for allowing us to participate in the care of the

patient.





__________________________________________

Silviano Bill MD



DD:  01/02/2018 20:21:12

DT:  01/02/2018 20:47:03

Job # [id number]

## 2018-01-05 NOTE — DS
CHIEF COMPLAINT:  Shortness of breath.



HISTORY OF PRESENT ILLNESS:  Mr. Bobby Urena is a 57-year-old male with

history of ascites, cirrhosis of the liver, history of ethanol abuse, came

in with shortness of breath for 4 to 5 days, worse with activity; currently

shortness of breath at rest when I saw the patient, coughing lots of sputum

production over the last few days.  The patient is also noting worsening of

weight gain and feeling abdominal tightness as well as lower extremity

swelling.  According to the patient, alcohol, last drink was a week ago. 

The patient states that he is not making any noises with breathing and no

wheezing.  The patient states that no fever or chills.  No nausea, vomiting

or diarrhea.  We admitted the  patient, the patient was in distress due to

abdominal distention.  Chest x-ray was done.  Ultrasound-guided

paracentesis was done by Dr. Mata Pruitt.  The patient was seen by Dr. Bill, GI; Dr. Dave, Infectious Disease; and Dr. Jones for shortness

of breath.  The patient had cellulitis of leg; for that, Dr. Steve Pierre

saw the patient.  The patient was given antibiotics with tapering dose of

steroid, and for the patient's pain management, he has chronic back pain,

stenosis of the vertebral.  He is the regular patient of Dr. Paxton Pradhan.  We put consult with Dr. Paxton Pradhan, he canceled the patient's

pain management.  The patient was given physical therapy, improved sent

home with prescriptions and with followup.



PAST MEDICAL HISTORY:  History of peripheral edema; asthma, COPD;

peripheral neuropathy; wears eye glasses; anemia; cirrhosis of the liver

due to ethanol abuse; multiple healed leg ulcer wounds, likely stasis

dermatitis, healed; arthritis; back pain; herniated disk; spinal stenosis;

unsteady gait, using cane; ascites, no paracentesis in the past; swollen

genitalia.



FAMILY HISTORY:  Father and mother noncontributory.



HABITS:  Currently smoking, we gave him nicotine patch.  Alcohol, yes,

amount 6 per day.  Substance abuse, no.



ALLERGIES:  THE PATIENT IS NOT ALLERGIC WITH ANY MEDICATIONS.



HOME MEDICATIONS:  Albuterol, Klonopin, folic acid, Lasix, Neurontin,

Seroquel, and _____.



REVIEW OF SYSTEMS:  The patient was seen and examined on the bedside, _____

to go home.  No shortness of  breath.  No nausea, vomiting or diarrhea.  No

hematuria or hematochezia.  Swelling of the leg is better.  No fever.  No

chills.  Tolerating BiPAP very well.  Cough improved.  Shortness of breath

improved.  He is able to walk himself.  Swelling of the leg improved.  No

testicular swelling.



PHYSICAL EXAMINATION:

VITAL SIGNS:  Temperature is 98, heart rate 70, respiratory rate 20, blood

pressure 134/82, and pulse oximetry 92% on room air.

HEENT:  Head normocephalic and atraumatic.  Eyes: PERRLA.  Extraocular

muscles intact.  Conjunctivae are clear.  Nose patent.

NECK:  Supple.  No carotid bruits, JVD, or thyromegaly.

CHEST:  Bilaterally symmetrical.

HEART:  S1 and S2 positive.

LUNGS:  Has fair airflow with rhonchi.

ABDOMEN:  Soft, nontender.  No organomegaly.

EXTREMITIES:  Decreased edema.

NEUROLOGICAL:  Awake, alert, and follows simple commands.



MEDICATIONS:  Albuterol, Aldactone, Augmentin, Cymbalta, folic acid,

Klonopin, Lasix, Lotrisone, gabapentin, Nicoderm, oxycodone, Seroquel;

Solu-Medrol, and Zithromax.



LABORATORY DATA:  Hemoglobin 14.5, hematocrit 45.7, white blood cell 7.3,

and platelets 135.  Sodium 142, potassium 4.4, BUN 23, creatinine 0.7, AST

46, and ALT 61.



ASSESSMENT:  Mr. Bobby Urena is a 57-year-old male with multiple

medical problems, morbid obesity, sleep apnea syndrome, chronic obstructive

lung disease, ascites status post volume paracentesis, pulmonary

hypertension, anxiety disorder, depression, history of suicidal ideation in

the past, lumbar radiculopathy.,  He is still actively smoking, got

nicotine patch, urged to quit smoking and drinking.  According to Dr. Jones, the patient should continue CPAP during sleep.  Renal

insufficiency, hyperglycemia, sepsis due to spontaneous bacterial

peritonitis, body mass index 46, degenerative joint disease, and peripheral

neuropathy.  The patient got antibiotics in the hospital.



PLAN:  Continue Augmentin for 6 more days and then switch to p.o. Cipro

weekly for prophylaxis.  The patient is told to come back to my office.  We

will discuss more options about prescription of the medications given.  The

patient will follow up with Dr. Paxton Pradhan for pain management and Dr. Jones for sleep apnea syndrome.  GI and DVT prophylaxis given.  We will

follow up.







__________________________________________

Estela Stein MD





DD:  01/04/2018 22:27:54

DT:  01/05/2018 1:43:36

Job # [id number]

## 2019-12-13 NOTE — CON
"    [facility] Medicine Services  PROGRESS NOTE    Patient Name: Vikas Rosas  : 1960  MRN: [id number]    Date of Admission: 10/20/DATE:  12/28/2017



NEUROLOGY CONSULTATION



CHIEF COMPLAINT:  Right thigh burning pain and numbness and tingling.



HISTORY OF PRESENT ILLNESS:  This is a 57-year-old man with past medical

history of COPD, history of respiratory failure in the past, chronic leg

edema, history of peripheral neuropathy, anemia, cirrhotic liver, ascites,

degenerative joint disease, herniated disks, spinal stenosis, history of

left paracentesis of the liver, and history of scrotal edema, who presents

with shortness of breath, cough, leg swelling, and bilateral lower

extremity edema which is being treated, was consulted due to right thigh

lateral aspect burning pain and numbness and tingling.  He mentioned that

after he stretched one day he started to develop this right thigh numbness

and tingling and small burning pain.  Currently, he is walking around

without any difficulty.  He is on gabapentin 600 mg p.o. b.i.d. and

Klonopin for anxiety.



PAST MEDICAL HISTORY:  History of sleep apnea, cirrhotic liver with

ascites, obstructive pulmonary disease, pulmonary hypertension, anxiety,

depression, bilateral chronic lower extremity edema.



SOCIAL HISTORY:  No illicit drug use, smoking, or EtOH use at this time.



ALLERGIES:  NO KNOWN DRUG ALLERGIES.



MEDICATIONS:  Reviewed by nurse's reconciliation sheet.



REVIEW OF SYSTEMS:  A 14-point review of systems is negative except as in

the HPI.



PHYSICAL EXAMINATION

GENERAL:  The patient is sitting up in bed, in no acute distress.

VITAL SIGNS:  Temperature 97.8, pulse rate _____, blood pressure 122/83,

respiratory rate of 22 and oxygen saturation of 99% by room air.

HEENT:  Head is atraumatic and normocephalic.  PERRLA.  Extraocular muscles

intact.

NECK:  Supple.  No JVD.  No adenopathy noted.

LUNGS:  Decreased breath sounds bilaterally.

ABDOMEN:  Soft, nontender and nondistended.  Bowel sounds are present.

EXTREMITIES:  Trace edema in the lower extremities and stasis dermatitis in

the lower extremities as well.

NEUROLOGIC:  The patient is alert and oriented to time, person, place,

month and year.  Speech is fluent without any errors.  Cranial nerves II

through XII are intact.  Poor attention span and slow thought process. 

Flat affect.  Motor exam:  Moves all extremities equally.  No pronator

drift seen.  Sensory exam:  Decreased light touch, pinprick, and

proprioception up to the calves bilaterally.  Decreased vibration of the

toes and knees.  DTRs are 2+ throughout, 1 at both knees and absent at the

ankles.  Coordination:  Finger-to-nose intact.  Gait is slightly wide

based.



LABORATORY DATA:  Sodium 143, potassium 4.1, chloride of 102, carbon

dioxide of 30, BUN of 15, creatinine 0.8, random glucose of 138.



ASSESSMENT AND PLAN:  This is a 57-year-old man with past medical history

of morbid obesity, pneumonia, ascites, sleep apnea syndrome, cirrhotic

liver, obstructive pulmonary disease, pulmonary hypertension, anxiety,

depression, and history of suicidal ideation, who came in for shortness of

breath and bilateral worsening lower extremity edema, which is being

evaluated, was consulted for right thigh burning pain with some paresthesia

in the right thigh area, and he mentioned that it happened after he did

extreme stretching over a week ago.  At this time, his right thigh

paresthesia and pain is likely secondary to right meralgia paresthetica

which is affecting the lateral femoral cutaneous nerve of the thigh.



At this time, recommend:

1.  Continue with gabapentin 600 mg p.o. b.i.d. and we will add Cymbalta 20

mg p.o. at bedtime for neuropathic relief.

2.  Will need physical therapy, likely subacute rehab in terms of hamstring

stretching as well as thigh exercises to relieve pressure over the lateral

femoral cutaneous nerve and therapeutic exercises.

3.  Continue with management for underlying COPD and bilateral chronic

lower extremity edema.

4.  Continue to monitor electrolytes and correct accordingly.



Once again, thank you for this consult.







__________________________________________

Jeremy Pérez MD





DD:  12/28/2017 16:57:49

DT:  12/28/2017 18:21:42

Job # [id number]] Collected:  10/20/19 1805    CMS/[facility]) [R56.9]  Yes   • Femur fracture (CMS/[facility]) [S72.90XA]  Yes   • COPD (chronic obstructive pulmonary disease) (CMS/[facility]) [J44.9]  Yes   • Decubital ulcer [L89.90]  Yes   • Alcoholism /alcohol abuse (CMS/[facility]) [F10.20]  Yes   • TBI (traumatic brain injury) (CMS/[facility]) [[facility] in [place].  [facility] [facility] in [place] 

## 2022-06-24 NOTE — PN
DATE:



SUBJECTIVE:  The patient is 57-year-old male.  The patient is seen and

examined on the bedside, looking comfortable, sitting on the chair. 

Swelling of the leg is getting better, oriented x3.  Pain is under control.

Shortness of breath is better, having compression stockings at the legs. 

He is ambulating, doing rounds on the floor in the hospital.  No chills. 

No fever.  No nausea, vomiting, or diarrhea.  No coughing.  No other pedal

complaints at that time.



PHYSICAL EXAMINATION:

VITAL SIGNS:  Temperature 97.8, pulse 68, respiratory rate 17, blood

pressure 120/80, pulse oximetry 97%.

HEENT:  Head normocephalic and atraumatic.  Eyes:  PERRLA.  Extraocular

muscles intact.  Conjunctivae clear.  Nose patent.

NECK:  Supple.  No carotid bruits.  No JVD or thyromegaly.

CHEST:  Bilaterally symmetrical.

HEART:  S1 and S2 positive.

LUNGS:  Clear to auscultation.

ABDOMEN:  Soft.  Bowel sounds positive.  No organomegaly.

EXTREMITIES:  No edema.  No cyanosis.

NEUROLOGIC:  The patient is awake and alert.  Moving all four extremities. 

No focal deficits.



MEDICATIONS:  Azithromycin, Lotrisone, Klonopin, Cymbalta, Lasix,

Neurontin, Lac-Hydrin, Solu-Medrol, oxycodone, Seroquel, Aldactone,

Thiamine.



LABORATORY DATA:  White blood cell 5.6, hemoglobin 14.2, hematocrit 45.7,

and platelets 152.  Sodium 147, potassium 4.3, BUN 19, creatinine 0.8,

glucose 106.



ASSESSMENT AND PLAN:  Mr. Ayanna Rosales is 57-years old male with

bilateral lower extremity edema with redness improving.  Podiatry is on the

case.  Dr. Pierre, saw the patient.  Getting Lotrisone cream on the legs,

compression stocking, obesity, cirrhotic liver, sleep apnea syndrome,

ascites, obstructive pulmonary disease, pulmonary hypertension, anxiety,

depression, history of suicidal ideation in the past, large ascites, status

post paracentesis, lumbar radiculopathy, history of herniated disk in the

back.  Dr. Paxton Pradhan is his Pain Management.  We will continue IV

inhaled bronchodilators.  Continue diuretics.  Dr. Pérez saw the patient. 

Dr. Bill also saw the patient.  Dr. Vergara is on the case. 

Gastrointestinal and deep vein thrombosis prophylaxis.  Repeat labs.  We

will follow with you.





__________________________________________

Estela Stein MD



DD:  12/30/2017 18:37:21

DT:  12/30/2017 19:21:25

Job # [id number] Date:  Time:    [x]First Attempt    [x]Second Attempt  [x]Final Attempt    Patient was called @ 1116 and 0910 due to being a no show for PHP session. Voicemail/Message left asking for return call. Emergency contact was called @ 5636. Ann-Marie Beams he would go downstairs to check on the patient. Writer asked for the patient to call.

## 2024-04-08 ENCOUNTER — NEW PATIENT COMPREHENSIVE (OUTPATIENT)
Dept: URBAN - METROPOLITAN AREA CLINIC 21 | Facility: CLINIC | Age: 64
End: 2024-04-08

## 2024-04-08 DIAGNOSIS — H52.4: ICD-10-CM

## 2024-04-08 DIAGNOSIS — H25.813: ICD-10-CM

## 2024-04-08 DIAGNOSIS — H31.011: ICD-10-CM

## 2024-04-08 PROCEDURE — 92250 FUNDUS PHOTOGRAPHY W/I&R: CPT

## 2024-04-08 PROCEDURE — 92015 DETERMINE REFRACTIVE STATE: CPT

## 2024-04-08 PROCEDURE — 92004 COMPRE OPH EXAM NEW PT 1/>: CPT

## 2024-04-08 ASSESSMENT — TONOMETRY
OS_IOP_MMHG: 14
OD_IOP_MMHG: 15

## 2024-04-08 ASSESSMENT — VISUAL ACUITY
OD_CC: 20/25-
OD_CC: J1+
OS_CC: 20/25-
OS_CC: J1+

## 2025-04-21 ENCOUNTER — ESTABLISHED COMPREHENSIVE EXAM (OUTPATIENT)
Dept: URBAN - METROPOLITAN AREA CLINIC 21 | Facility: CLINIC | Age: 65
End: 2025-04-21

## 2025-04-21 DIAGNOSIS — H52.03: ICD-10-CM

## 2025-04-21 DIAGNOSIS — H25.813: ICD-10-CM

## 2025-04-21 DIAGNOSIS — H04.123: ICD-10-CM

## 2025-04-21 DIAGNOSIS — H31.011: ICD-10-CM

## 2025-04-21 PROCEDURE — 92014 COMPRE OPH EXAM EST PT 1/>: CPT

## 2025-04-21 PROCEDURE — 92250 FUNDUS PHOTOGRAPHY W/I&R: CPT

## 2025-04-21 PROCEDURE — 92015 DETERMINE REFRACTIVE STATE: CPT

## 2025-04-21 ASSESSMENT — VISUAL ACUITY
OS_CC: 20/30+3
OD_CC: 20/20-3

## 2025-04-21 ASSESSMENT — TONOMETRY
OS_IOP_MMHG: 13
OD_IOP_MMHG: 15